# Patient Record
Sex: FEMALE | Race: WHITE | NOT HISPANIC OR LATINO | Employment: OTHER | URBAN - METROPOLITAN AREA
[De-identification: names, ages, dates, MRNs, and addresses within clinical notes are randomized per-mention and may not be internally consistent; named-entity substitution may affect disease eponyms.]

---

## 2017-02-08 ENCOUNTER — OFFICE VISIT (OUTPATIENT)
Dept: OPHTHALMOLOGY | Facility: CLINIC | Age: 57
End: 2017-02-08

## 2017-02-08 DIAGNOSIS — H43.393 VITREOUS SYNERESIS OF BOTH EYES: ICD-10-CM

## 2017-02-08 DIAGNOSIS — H35.419 LATTICE DEGENERATION OF PERIPHERAL RETINA, UNSPECIFIED LATERALITY: Primary | ICD-10-CM

## 2017-02-08 RX ORDER — GARLIC 180 MG
TABLET, DELAYED RELEASE (ENTERIC COATED) ORAL
COMMUNITY
End: 2019-11-14

## 2017-02-08 RX ORDER — SACCHAROMYCES BOULARDII 250 MG
250 CAPSULE ORAL 2 TIMES DAILY
COMMUNITY
End: 2017-03-16

## 2017-02-08 ASSESSMENT — CONF VISUAL FIELD
OS_NORMAL: 1
OD_NORMAL: 1

## 2017-02-08 ASSESSMENT — SLIT LAMP EXAM - LIDS
COMMENTS: NORMAL
COMMENTS: NORMAL

## 2017-02-08 ASSESSMENT — TONOMETRY
IOP_METHOD: ICARE
OD_IOP_MMHG: 20
OS_IOP_MMHG: 22

## 2017-02-08 ASSESSMENT — VISUAL ACUITY
OD_CC: 20/20
CORRECTION_TYPE: CONTACTS
METHOD: SNELLEN - LINEAR
OS_CC: 20/20

## 2017-02-08 ASSESSMENT — EXTERNAL EXAM - RIGHT EYE: OD_EXAM: NORMAL

## 2017-02-08 ASSESSMENT — CUP TO DISC RATIO
OD_RATIO: 0.2
OS_RATIO: 0.2

## 2017-02-08 ASSESSMENT — EXTERNAL EXAM - LEFT EYE: OS_EXAM: NORMAL

## 2017-02-08 NOTE — MR AVS SNAPSHOT
After Visit Summary   2/8/2017    Genesis Conklin    MRN: [id number]           Patient Information     Date Of Birth          1960        Visit Information        Provider Department      2/8/2017 8:40 AM Jaren Araujo MD [facility]         Follow-ups after your visit        Your next 10 appointments already scheduled     Apr 24, 2017  3:00 PM   Complete Exam with Jaren Araujo MD   [facility] ([facility])    [facility]  [address]  [facility] [address] [phone]              Who to contact     Please call your clinic at [phone] to:    Ask questions about your health    Make or cancel appointments    Discuss your medicines    Learn about your test results    Speak to your doctor   If you have compliments or concerns about an experience at your clinic, or if you wish to file a complaint, please contact [facility] Patient Relations at [phone] or email us at [email or web address]         Additional Information About Your Visit        Care EveryWhere ID     This is your Care EveryWhere ID. This could be used by other organizations to access your [place] medical records  [id number]         Blood Pressure from Last 3 Encounters:   No data found for BP    Weight from Last 3 Encounters:   No data found for Wt              Today, you had the following     No orders found for display       Primary Care Provider    None Specified       No primary provider on file.        Thank you!     Thank you for choosing [facility]  for your care. Our goal is always to provide you with excellent care. Hearing back from our patients is one way we can continue to improve our services. Please take a few minutes to complete the written survey that you may receive in the mail after your visit with us. Thank  you!             Your Updated Medication List - Protect others around you: Learn how to safely use, store and throw away your medicines at www.disposemymeds.org.          This list is accurate as of: 2/8/17  9:59 AM.  Always use your most recent med list.                   Brand Name Dispense Instructions for use    ASPIRIN PO          Black Cohosh 40 MG Caps          saccharomyces boulardii 250 MG capsule    FLORASTOR     Take 250 mg by mouth 2 times daily       VITAMIN D3 PO      Take by mouth daily

## 2017-02-08 NOTE — NURSING NOTE
Chief Complaints and History of Present Illnesses   Patient presents with     Eye Problem     spot in central [facility] RE     HPI    Symptoms:        Duration:  2 weeks         Comments:  Black or mesh pattern in [place]  Of RE notices more wearing glasses  And when reading  Yoli Benjamin COT 9:05 AM February 8, 2017

## 2017-02-24 ASSESSMENT — REFRACTION_WEARINGRX
OD_SPHERE: +1.50
SPECS_TYPE: PAL
SPECS_TYPE: SVL
OS_SPHERE: +1.50
OS_CYLINDER: +0.75
OD_CYLINDER: +0.75
OD_SPHERE: -7.75
OS_AXIS: 150
OD_ADD: +1.50
OD_AXIS: 95
OS_SPHERE: -9.00
OS_ADD: +1.50

## 2017-02-24 NOTE — PROGRESS NOTES
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 or sooner as needed.    I have confirmed the content of the chief complaint, history of present illness, review of systems, and past medical/surgical history sections and edited the information as needed.    Complete documentation of historical and exam elements from today's encounter can  be found in the full encounter summary report (not reduplicated in this progress  note). I personally obtained the chief complaint(s) and history of present illness. I  confirmed and edited as necessary the review of systems, past medical/surgical  history, family history, social history, and examination findings as documented by  others; and I examined the patient myself. I personally reviewed the relevant tests,  images, and reports as documented above. I formulated and edited as necessary the  assessment and plan and discussed the findings and management plan with the  patient and family.    LNOI Araujo M.D.

## 2017-03-16 ENCOUNTER — OFFICE VISIT (OUTPATIENT)
Dept: OPHTHALMOLOGY | Facility: CLINIC | Age: 57
End: 2017-03-16

## 2017-03-16 DIAGNOSIS — H52.4 PRESBYOPIA: ICD-10-CM

## 2017-03-16 DIAGNOSIS — H52.13 MYOPIA, BILATERAL: Primary | ICD-10-CM

## 2017-03-16 DIAGNOSIS — H52.10 MYOPIA, UNSPECIFIED LATERALITY: Primary | ICD-10-CM

## 2017-03-16 DIAGNOSIS — H43.393 VITREOUS SYNERESIS OF BOTH EYES: ICD-10-CM

## 2017-03-16 ASSESSMENT — REFRACTION_CURRENTRX
OS_BASECURVE: 8.6
OD_DIAMETER: 14.2
OS_BRAND: PROCLEAR
OD_SPHERE: -6.50
OD_BASECURVE: 8.6
OS_SPHERE: -8.50
OS_DIAMETER: 14.0
OS_BASECURVE: 8.6
OS_DIAMETER: 14.2
OD_SPHERE: -7.00
OD_BASECURVE: 8.6
OD_DIAMETER: 14.0
OS_SPHERE: -8.50
OD_BRAND: PROCLEAR

## 2017-03-16 ASSESSMENT — REFRACTION_WEARINGRX
OD_SPHERE: +1.75
SPECS_TYPE: OTC
OS_SPHERE: -9.00
OS_AXIS: 150
OD_ADD: +1.50
SPECS_TYPE: PAL
OS_SPHERE: +1.75
OS_ADD: +1.50
OD_SPHERE: -7.75
OS_CYLINDER: +0.75
OD_AXIS: 95
OD_CYLINDER: +0.75

## 2017-03-16 ASSESSMENT — REFRACTION_MANIFEST
OD_AXIS: 090
OD_SPHERE: -0.50
OS_SPHERE: -10.25
OD_CYLINDER: +0.50
OS_SPHERE: -0.25
OS_AXIS: 170
OD_SPHERE: -8.50
OS_CYLINDER: +0.75

## 2017-03-16 ASSESSMENT — EXTERNAL EXAM - RIGHT EYE: OD_EXAM: NORMAL

## 2017-03-16 ASSESSMENT — EXTERNAL EXAM - LEFT EYE: OS_EXAM: NORMAL

## 2017-03-16 ASSESSMENT — SLIT LAMP EXAM - LIDS
COMMENTS: NORMAL
COMMENTS: NORMAL

## 2017-03-16 ASSESSMENT — CONF VISUAL FIELD
OS_NORMAL: 1
OD_NORMAL: 1

## 2017-03-16 ASSESSMENT — TONOMETRY
IOP_METHOD: ICARE
OD_IOP_MMHG: 18
OS_IOP_MMHG: 19

## 2017-03-16 ASSESSMENT — VISUAL ACUITY
METHOD: SNELLEN - LINEAR
CORRECTION_TYPE: CONTACTS
OD_CC: 20/30
OS_CC: 20/20

## 2017-03-16 ASSESSMENT — CUP TO DISC RATIO
OS_RATIO: 0.2
OD_RATIO: 0.2

## 2017-03-16 NOTE — MR AVS SNAPSHOT
After Visit Summary   3/16/2017    Genesis Conklin    MRN: [id number]           Patient Information     Date Of Birth          1960        Visit Information        Provider Department      3/16/2017 11:30 AM MM [facility]        Today's Diagnoses     Myopia, bilateral    -  1    Presbyopia           Follow-ups after your visit        Your next 10 appointments already scheduled     Mar 23, 2017  9:00 AM CDT   CONTACT LENS FITTING with MM [facility] ([facility] Clinics)    [facility]  [address] [id number] [phone]              Who to contact     Please call your clinic at [phone] to:    Ask questions about your health    Make or cancel appointments    Discuss your medicines    Learn about your test results    Speak to your doctor   If you have compliments or concerns about an experience at your clinic, or if you wish to file a complaint, please contact [facility] Patient Relations at [phone] or email us at [email or web address]         Additional Information About Your Visit        Care EveryWhere ID     This is your Care EveryWhere ID. This could be used by other organizations to access your [place] medical records  [id number]         Blood Pressure from Last 3 Encounters:   No data found for BP    Weight from Last 3 Encounters:   No data found for Wt              Today, you had the following     No orders found for display         Today's Medication Changes          These changes are accurate as of: 3/16/17 11:59 PM.  If you have any questions, ask your nurse or doctor.               Stop taking these medicines if you haven't already. Please contact your care team if you have questions.     saccharomyces boulardii 250 MG capsule   Commonly known as:  FLORASTOR   Stopped by:  Jaren Araujo  MD Francia                    Primary Care Provider Office Phone # Fax #    Nery Sanford MD, [phone]       [facility] [address] [address]        Thank you!     Thank you for choosing [facility]  for your care. Our goal is always to provide you with excellent care. Hearing back from our patients is one way we can continue to improve our services. Please take a few minutes to complete the written survey that you may receive in the mail after your visit with us. Thank you!             Your Updated Medication List - Protect others around you: Learn how to safely use, store and throw away your medicines at www.disposemymeds.org.          This list is accurate as of: 3/16/17 11:59 PM.  Always use your most recent med list.                   Brand Name Dispense Instructions for use    ASPIRIN PO          Black Cohosh 40 MG Caps          PROBIOTIC DAILY PO          VITAMIN D3 PO      Take by mouth daily

## 2017-03-16 NOTE — NURSING NOTE
.  Chief Complaints and History of Present Illnesses   Patient presents with     Blurred Vision Both Eyes     contact lens blurry/dry     HPI    Affected eye(s):  Right   Symptoms:     Distorted vision         Do you have eye pain now?:  No      Comments:  Blurred [place] Lens irritation in RE, it is tight   Pulling on the Conj, only in RE  MOR today noted  Pt has to clean contacts often  Will give Clear Care and change  Diameter of Contact first to resolve  Tight issue, Pt C/O dry eyes uses [employer] Several  Times per day  Yoli Benjamin COT 10:38 AM March 16, 2017

## 2017-03-16 NOTE — PROGRESS NOTES
Assessment & Plan      Genesis Conklin is a 56 year old female with the following diagnoses:   (H52.10) Myopia, unspecified laterality  (primary encounter diagnosis)  Comment: Change  Plan: Rx    (H43.393) Vitreous syneresis of both eyes  Comment: More noticeable OD  Plan: Follow     -----------------------------------------------------------------------------------      Patient disposition:   Return in about 1 year (around 3/16/2018) for Complete Eye Exam. or sooner as needed.    Complete documentation of historical and exam elements from today's encounter can  be found in the full encounter summary report (not reduplicated in this progress  note). I personally obtained the chief complaint(s) and history of present illness. I  confirmed and edited as necessary the review of systems, past medical/surgical  history, family history, social history, and examination findings as documented by  others; and I examined the patient myself. I personally reviewed the relevant tests,  images, and reports as documented above. I formulated and edited as necessary the  assessment and plan and discussed the findings and management plan with the  patient and family.    LONI Araujo M.D

## 2017-03-16 NOTE — MR AVS SNAPSHOT
After Visit Summary   3/16/2017    Genesis Conklin    MRN: [id number]           Patient Information     Date Of Birth          1960        Visit Information        Provider Department      3/16/2017 10:00 AM Jaren Araujo MD [facility]        Today's Diagnoses     Myopia, unspecified laterality    -  1    Vitreous syneresis of both eyes           Follow-ups after your visit        Follow-up notes from your care team     Return in about 1 year (around 3/16/2018) for Complete Eye Exam.      Your next 10 appointments already scheduled     Mar 23, 2017  9:00 AM CDT   CONTACT LENS FITTING with MM [facility] MME TECH   [facility] ([facility])    [facility]  [address] [address] [phone]              Who to contact     Please call your clinic at [phone] to:    Ask questions about your health    Make or cancel appointments    Discuss your medicines    Learn about your test results    Speak to your doctor   If you have compliments or concerns about an experience at your clinic, or if you wish to file a complaint, please contact [facility] Patient Relations at [phone] or email us at [email or web address]         Additional Information About Your Visit        Care EveryWhere ID     This is your Care EveryWhere ID. This could be used by other organizations to access your [place] medical records  [id number]         Blood Pressure from Last 3 Encounters:   No data found for BP    Weight from Last 3 Encounters:   No data found for Wt              Today, you had the following     No orders found for display         Today's Medication Changes          These changes are accurate as of: 3/16/17 12:30 PM.  If you have any questions, ask your nurse or doctor.               Stop taking these medicines if you haven't already. Please  contact your care team if you have questions.     saccharomyces boulardii 250 MG capsule   Commonly known as:  FLORASTOR   Stopped by:  Jaren Araujo MD                    Primary Care Provider Office Phone # Fax #    Nery YAMILEX Sanford MD, [phone]       [facility] [address]  [facility] [address]        Thank you!     Thank you for choosing [facility]  for your care. Our goal is always to provide you with excellent care. Hearing back from our patients is one way we can continue to improve our services. Please take a few minutes to complete the written survey that you may receive in the mail after your visit with us. Thank you!             Your Updated Medication List - Protect others around you: Learn how to safely use, store and throw away your medicines at www.disposemymeds.org.          This list is accurate as of: 3/16/17 12:30 PM.  Always use your most recent med list.                   Brand Name Dispense Instructions for use    ASPIRIN PO          Black Cohosh 40 MG Caps          PROBIOTIC DAILY PO          VITAMIN D3 PO      Take by mouth daily

## 2017-03-20 ASSESSMENT — REFRACTION_CURRENTRX
OS_BASECURVE: 8.6
OD_DIAMETER: 14.2
OD_BRAND: PROCLEAR
OD_SPHERE: -6.50
OD_SPHERE: -7.00
OS_BASECURVE: 8.6
OS_SPHERE: -8.50
OD_DIAMETER: 14.0
OD_BASECURVE: 8.6
OD_BASECURVE: 8.6
OS_BRAND: PROCLEAR
OS_DIAMETER: 14.0
OS_ADDL_SPECS: BIOFINITY
OS_DIAMETER: 14.2
OS_SPHERE: -8.50
OD_ADDL_SPECS: BIOFINITY

## 2017-03-20 NOTE — PROGRESS NOTES
Contact Lens Billing  V-Code:  - Soft spherical    Contact Lens Fitting Level 2 CPT 6243371 $85.00  These are for cosmetic contact lenses.    Encounter Diagnoses   Name Primary?     Myopia, bilateral Yes     Presbyopia         Date of last eye exam: 3/16/17  Trials of [employer] 8.6/14.0 Follow up in week -7.00/-8.50

## 2017-03-23 ENCOUNTER — OFFICE VISIT (OUTPATIENT)
Dept: OPHTHALMOLOGY | Facility: CLINIC | Age: 57
End: 2017-03-23

## 2017-03-23 DIAGNOSIS — H52.13 MYOPIA, BILATERAL: Primary | ICD-10-CM

## 2017-03-23 ASSESSMENT — REFRACTION_CURRENTRX
OS_SPHERE: -8.50
OS_BRAND: PROCLEAR
OD_DIAMETER: 14.2
OS_DIAMETER: 14.2
OD_SPHERE: -6.50
OD_BRAND: PROCLEAR
OD_BASECURVE: 8.6
OS_BASECURVE: 8.6
OD_DIAMETER: 14.0
OD_SPHERE: -7.00
OS_DIAMETER: 14.0
OS_ADDL_SPECS: BIOFINITY
OD_BASECURVE: 8.6
OD_ADDL_SPECS: BIOFINITY
OS_BASECURVE: 8.6
OS_SPHERE: -8.50

## 2017-03-23 ASSESSMENT — VISUAL ACUITY
METHOD: SNELLEN - LINEAR
CORRECTION_TYPE: CONTACTS
OD_CC: 20/20
OS_CC: 20/20

## 2017-03-23 NOTE — MR AVS SNAPSHOT
After Visit Summary   3/23/2017    Genesis Conklin    MRN: [id number]           Patient Information     Date Of Birth          1960        Visit Information        Provider Department      3/23/2017 9:00 AM MM [facility] M[facility] TECH [facility]        Today's Diagnoses     Myopia, bilateral    -  1       Follow-ups after your visit        Who to contact     Please call your clinic at [phone] to:    Ask questions about your health    Make or cancel appointments    Discuss your medicines    Learn about your test results    Speak to your doctor   If you have compliments or concerns about an experience at your clinic, or if you wish to file a complaint, please contact [facility] Patient Relations at [phone] or email us at [email or web address]         Additional Information About Your Visit        Care EveryWhere ID     This is your Care EveryWhere ID. This could be used by other organizations to access your [place] medical records  [id number]         Blood Pressure from Last 3 Encounters:   No data found for BP    Weight from Last 3 Encounters:   No data found for Wt              Today, you had the following     No orders found for display       Primary Care Provider Office Phone # Fax #    Nery Sanford MD, [phone]       [facility] [address] [address]        Thank you!     Thank you for choosing [facility]  for your care. Our goal is always to provide you with excellent care. Hearing back from our patients is one way we can continue to improve our services. Please take a few minutes to complete the written survey that you may receive in the mail after your visit with us. Thank you!             Your Updated Medication List - Protect others around you: Learn how to safely use, store and throw away your medicines at www.disposemymeds.org.           This list is accurate as of: 3/23/17  9:21 AM.  Always use your most recent med list.                   Brand Name Dispense Instructions for use    ASPIRIN PO          Black Cohosh 40 MG Caps          PROBIOTIC DAILY PO          VITAMIN D3 PO      Take by mouth daily

## 2017-03-23 NOTE — PROGRESS NOTES
Chief Complaints and History of Present Illnesses   Patient presents with     Follow Up For     Conacts NO CHARGE for F/U     HPI    Symptoms:              Comments:  Pt loves her new contacts, she states they are   More comfortable she doesn't even know she is  Wearing them, return to clinic around 3/16/18  For complete with Dr Gayle Benjamin COT 9:20 AM March 23, 2017

## 2017-05-17 ENCOUNTER — TELEPHONE (OUTPATIENT)
Dept: OPHTHALMOLOGY | Facility: CLINIC | Age: 57
End: 2017-05-17

## 2017-05-19 ENCOUNTER — TELEPHONE (OUTPATIENT)
Dept: OPHTHALMOLOGY | Facility: CLINIC | Age: 57
End: 2017-05-19

## 2017-05-19 NOTE — TELEPHONE ENCOUNTER
Pt at [facility] medical eye clinic (under construction this week)  Pt had complaints of worsening symptoms after initial phone call 5-17-17  Pt called yesterday AM  Left messages yesterday, and today twice with direct triage number  No callback  Will forward to MME clinic tech to f/u Monday when clinic reopens  Asked pt to call me today yet if able  Asked to use on call system for worsening symptoms over weekend-- main hospital number provided  Brian Xiao RN 1:43 PM 05/19/17

## 2017-05-19 NOTE — TELEPHONE ENCOUNTER
----- Message from Brian iXao RN sent at 5/19/2017  8:28 AM CDT -----  Regarding: FW: Message for Yoli Araujo  Contact: [phone]   Left message with direct number at [phone]  Brian Xiao RN 8:28 AM 05/19/17    ----- Message -----     From: Brian Xiao RN     Sent: 5/18/2017   3:26 PM       To: Eye Triage-[facility]  Subject: FW: Message for Yoli Araujo                 Left message with direct triage number at [phone]  Brian Xiao RN 3:26 PM 05/18/17    ----- Message -----     From: Yoli Benjamin COT     Sent: 5/18/2017   2:42 PM       To: Brian Xiao RN  Subject: FW: Message for Yoli Araujo                Hello Sorry I forgot to send....  ----- Message -----     From: Carolann Hernandez     Sent: 5/18/2017   9:07 AM       To: Mme   Subject: Message for Yoli Araujo                    The pt wanted to fine tune the discussion she had with Yoli the other day. The Pt has an ongoing issue with her R eye. She has an aura that has lasted for 6 days which is almost constant. She has also had a change in vision in R eye so that now it is sort of cloudy.   Please call her on Monday 5.22.17 at [phone] to discuss.    Preethi Vuong    Please DO NOT send this message and/or reply back to sender.  Call Center Representatives DO NOT respond to messages.

## 2017-05-23 ENCOUNTER — OFFICE VISIT (OUTPATIENT)
Dept: OPHTHALMOLOGY | Facility: CLINIC | Age: 57
End: 2017-05-23

## 2017-05-23 DIAGNOSIS — H52.13 HIGH MYOPIA, BOTH EYES: ICD-10-CM

## 2017-05-23 DIAGNOSIS — H43.819 PVD (POSTERIOR VITREOUS DETACHMENT), UNSPECIFIED LATERALITY: Primary | ICD-10-CM

## 2017-05-23 DIAGNOSIS — H35.433: ICD-10-CM

## 2017-05-23 ASSESSMENT — CONF VISUAL FIELD
OD_NORMAL: 1
METHOD: COUNTING FINGERS
OS_NORMAL: 1

## 2017-05-23 ASSESSMENT — SLIT LAMP EXAM - LIDS
COMMENTS: NORMAL
COMMENTS: NORMAL

## 2017-05-23 ASSESSMENT — VISUAL ACUITY
OD_CC: 20/15-3
OS_CC: 20/15-3
CORRECTION_TYPE: CONTACTS
METHOD: SNELLEN - LINEAR

## 2017-05-23 ASSESSMENT — TONOMETRY
OD_IOP_MMHG: 20
OS_IOP_MMHG: 19
IOP_METHOD: ICARE

## 2017-05-23 ASSESSMENT — EXTERNAL EXAM - LEFT EYE: OS_EXAM: NORMAL

## 2017-05-23 ASSESSMENT — CUP TO DISC RATIO
OS_RATIO: 0.2
OD_RATIO: 0.2

## 2017-05-23 ASSESSMENT — EXTERNAL EXAM - RIGHT EYE: OD_EXAM: NORMAL

## 2017-05-23 NOTE — PROGRESS NOTES
HPI  Genesis Conklin is a 56 year old female here for new right sided visual aura three weeks ago-light was jagged over several days, more noticeable in bright light.  Now more floaters in the right eye.  No change in acuity.    PMH:  None   POH:  Glasses and soft contact lenses for mildly high myopia, no surgery, no trauma  Oc Meds:  none  FH:  Mother had glaucoma, denies age related macular degeneration, retinal detachment,  or other known eye diseases       Assessment & Plan      (H43.819) PVD (posterior vitreous detachment), unspecified laterality - right Eye  Comment:  No Dutch's sign, retinal tears, or detachment seen on exam today.  Plan:  Natural history of posterior vitreous detachment as well as retinal tear/detachment signs and symptoms discussed with patient.  Told to call for prompt dilated exam if these signs or symptoms occur.      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 Call for sooner appointment as needed.    Complete documentation of historical and exam elements from today's encounter can be found in the full encounter summary report (not reduplicated in this progress note). I personally obtained the chief complaint(s) and history of present illness.  I have confirmed and edited as necessary the CC, HPI, PMH/PSH, social history, FMH, ROS, and exam/neuro findings as obtained by the technician or others. I have examined this patient myself and I personally viewed the image(s) and studies listed above and the documentation reflects my findings and interpretation.

## 2017-05-23 NOTE — NURSING NOTE
Chief Complaints and History of Present Illnesses   Patient presents with     Floaters Right Eye     HPI    Affected eye(s):  Right   Symptoms:     Floaters   No flashes      Duration:  3 weeks   Frequency:  Constant       Do you have eye pain now?:  No      Comments:  Floaters, aura and filmy vision over RE x 3 weeks (has migraine headaches so it is an aura similar to what that was) - not seeing aura now but is seeing floaters constantly  No eye pain  No change in sharpness of vision  No curtain effect    Annamaria Balderas, COA 3:13 PM 05/23/2017

## 2017-05-23 NOTE — MR AVS SNAPSHOT
After Visit Summary   2017    Genesis Conklin    MRN: [id number]           Patient Information     Date Of Birth          1960        Visit Information        Provider Department      2017 2:20 PM Paula Lamas MD [facility]        Today's Diagnoses     PVD (posterior vitreous detachment), unspecified laterality - Right Eye    -  1    High myopia, both eyes - Both Eyes        Paving stone degeneration of peripheral retina, bilateral - Both Eyes           Follow-ups after your visit        Follow-up notes from your care team     Return in about 1 month (around 2017) for Follow-up PVD.      Who to contact     Please call your clinic at [phone] to:    Ask questions about your health    Make or cancel appointments    Discuss your medicines    Learn about your test results    Speak to your doctor   If you have compliments or concerns about an experience at your clinic, or if you wish to file a complaint, please contact [facility] Patient Relations at [phone] or email us at [email or web address]         Additional Information About Your Visit        MyChart Information     blinkbox music is an electronic gateway that provides easy, online access to your medical records. With blinkbox music, you can request a clinic appointment, read your test results, renew a prescription or communicate with your care team.     To sign up for blinkbox music visit the website at [email or web address]   You will be asked to enter the access code listed below, as well as some personal information. Please follow the directions to create your username and password.     Your access code is: [email or web address]  Expires: 2017  4:19 PM     Your access code will  in 90 days. If you need help or a new code, please contact your [facility] or call [phone] for assistance.        Care EveryWhere ID      This is your Care EveryWhere ID. This could be used by other organizations to access your [place] medical records  [id number]         Blood Pressure from Last 3 Encounters:   No data found for BP    Weight from Last 3 Encounters:   No data found for Wt              Today, you had the following     No orders found for display       Primary Care Provider Office Phone # Fax #    Nery Sanford MD, [phone]       [facility] [address]  [facility] [address]        Thank you!     Thank you for choosing [facility]  for your care. Our goal is always to provide you with excellent care. Hearing back from our patients is one way we can continue to improve our services. Please take a few minutes to complete the written survey that you may receive in the mail after your visit with us. Thank you!             Your Updated Medication List - Protect others around you: Learn how to safely use, store and throw away your medicines at www.disposemymeds.org.          This list is accurate as of: 5/23/17 11:59 PM.  Always use your most recent med list.                   Brand Name Dispense Instructions for use    ASPIRIN PO          Black Cohosh 40 MG Caps          PROBIOTIC DAILY PO          VITAMIN D3 PO      Take by mouth daily

## 2017-06-27 ENCOUNTER — OFFICE VISIT (OUTPATIENT)
Dept: OPHTHALMOLOGY | Facility: CLINIC | Age: 57
End: 2017-06-27

## 2017-06-27 DIAGNOSIS — H43.819 PVD (POSTERIOR VITREOUS DETACHMENT), UNSPECIFIED LATERALITY: Primary | ICD-10-CM

## 2017-06-27 DIAGNOSIS — H52.13 HIGH MYOPIA, BOTH EYES: ICD-10-CM

## 2017-06-27 ASSESSMENT — VISUAL ACUITY
OD_CC: 20/15
OD_CC+: -1
METHOD: SNELLEN - LINEAR
OS_CC: 20/20
CORRECTION_TYPE: CONTACTS
OS_CC+: +2

## 2017-06-27 ASSESSMENT — SLIT LAMP EXAM - LIDS
COMMENTS: NORMAL
COMMENTS: NORMAL

## 2017-06-27 ASSESSMENT — CONF VISUAL FIELD
OS_NORMAL: 1
OD_NORMAL: 1
METHOD: COUNTING FINGERS

## 2017-06-27 ASSESSMENT — CUP TO DISC RATIO
OD_RATIO: 0.2
OS_RATIO: 0.2

## 2017-06-27 ASSESSMENT — EXTERNAL EXAM - LEFT EYE: OS_EXAM: NORMAL

## 2017-06-27 ASSESSMENT — EXTERNAL EXAM - RIGHT EYE: OD_EXAM: NORMAL

## 2017-06-27 ASSESSMENT — TONOMETRY
OS_IOP_MMHG: 16
OD_IOP_MMHG: 17
IOP_METHOD: ICARE

## 2017-06-27 NOTE — MR AVS SNAPSHOT
After Visit Summary   2017    Genesis Conklin    MRN: [id number]           Patient Information     Date Of Birth          1960        Visit Information        Provider Department      2017 8:20 AM Paula Lamas MD [facility]        Today's Diagnoses     PVD (posterior vitreous detachment), unspecified laterality - Right Eye    -  1    High myopia, both eyes - Both Eyes           Follow-ups after your visit        Follow-up notes from your care team     Return in about 6 months (around 2017) for Comprehensive Exam- myopia, ctl wearer- wpr patient.      Who to contact     Please call your clinic at [phone] to:    Ask questions about your health    Make or cancel appointments    Discuss your medicines    Learn about your test results    Speak to your doctor   If you have compliments or concerns about an experience at your clinic, or if you wish to file a complaint, please contact [facility] Patient Relations at [phone] or email us at [email or web address]         Additional Information About Your Visit        MyChart Information     Forseva is an electronic gateway that provides easy, online access to your medical records. With Forseva, you can request a clinic appointment, read your test results, renew a prescription or communicate with your care team.     To sign up for Forseva visit the website at [email or web address]   You will be asked to enter the access code listed below, as well as some personal information. Please follow the directions to create your username and password.     Your access code is: [email or web address]  Expires: 2017  4:19 PM     Your access code will  in 90 days. If you need help or a new code, please contact your [facility] or call [phone] for assistance.        Care EveryWhere ID     This is your Care EveryWhere ID.  This could be used by other organizations to access your [place] medical records  [id number]         Blood Pressure from Last 3 Encounters:   No data found for BP    Weight from Last 3 Encounters:   No data found for Wt              Today, you had the following     No orders found for display       Primary Care Provider Office Phone # Fax #    Nery Sanford MD, [phone]       [facility] [address]  [place] [address]        Equal Access to Services     [facility] : Hadii aad ku hadasho Soomaali, waaxda luqadaha, qaybta kaalmada adeegyada, waxay idiin hayaan adeeg kharash la'aan . So [facility] [phone].    ATENCIÓN: Si habla español, tiene a avila disposición servicios gratuitos de asistencia lingüística. [facility] [phone].    We comply with applicable federal civil rights laws and [place] laws. We do not discriminate on the basis of race, color, national origin, age, disability sex, sexual orientation or gender identity.            Thank you!     Thank you for choosing [facility]  for your care. Our goal is always to provide you with excellent care. Hearing back from our patients is one way we can continue to improve our services. Please take a few minutes to complete the written survey that you may receive in the mail after your visit with us. Thank you!             Your Updated Medication List - Protect others around you: Learn how to safely use, store and throw away your medicines at www.disposemymeds.org.          This list is accurate as of: 6/27/17  4:23 PM.  Always use your most recent med list.                   Brand Name Dispense Instructions for use Diagnosis    ASPIRIN PO           Black Cohosh 40 MG Caps           PROBIOTIC DAILY PO           VITAMIN D3 PO      Take by mouth daily

## 2017-06-27 NOTE — PROGRESS NOTES
HPI  Genesis Conklin is a 56 year old female here for follow-up of posterior vitreous detachment right eye.  No more floaters in the right eye, still sees the same larger one.  No flashes, seems blurry in the right eye still.    PMH:  None   POH:  Glasses and soft contact lenses for mildly high myopia, no surgery, no trauma  Oc Meds:  none  FH:  Mother had glaucoma, denies age related macular degeneration, retinal detachment,  or other known eye diseases       Assessment & Plan      (H43.819) PVD (posterior vitreous detachment), unspecified laterality - right Eye  Comment:  No Dutch's sign, retinal tears, or detachment seen again on exam today   Plan:  Natural history of posterior vitreous detachment as well as retinal tear/detachment signs and symptoms discussed with patient.  Told to call for prompt dilated exam if these signs or symptoms occur.      (H52.13) High myopia, both eyes - Both Eyes  Comment: stable visual acuity   Plan: reviewed higher risk of retinal tear/rd per above         -----------------------------------------------------------------------------------    Patient disposition:   Return in about 6 months (around 12/27/2017) for Comprehensive Exam- myopia, ctl wearer- wpr patient. Call for sooner appointment as needed.    Complete documentation of historical and exam elements from today's encounter can be found in the full encounter summary report (not reduplicated in this progress note). I personally obtained the chief complaint(s) and history of present illness.  I have confirmed and edited as necessary the CC, HPI, PMH/PSH, social history, FMH, ROS, and exam/neuro findings as obtained by the technician or others. I have examined this patient myself and I personally viewed the image(s) and studies listed above and the documentation reflects my findings and interpretation.

## 2017-06-27 NOTE — NURSING NOTE
Chief Complaints and History of Present Illnesses   Patient presents with     Follow Up For     5 week recheck of PVD of RE     HPI    Affected eye(s):  Right   Symptoms:     No decreased vision   Floaters (Comment: no increase in amount of floaters noted since last visit)   No flashes      Duration:  5 weeks   Frequency:  Constant       Do you have eye pain now?:  No      Chief Complaints and History of Present Illnesses   Patient presents with     Follow Up For     5 week recheck of PVD of RE     HPI    Affected eye(s):  Right   Symptoms:     Blurred vision   No decreased vision   Floaters (Comment: no increase in amount of floaters noted since last visit)   No flashes      Duration:  5 weeks   Frequency:  Constant       Do you have eye pain now?:  No      Comments:  Pt feels that her brain is getting use to the floaters / no more noticed / no shadow effect, but does note 'clouding of VA of RE'. Has cleaned CTL multiple times so doesn't think CTL is a contributory factor.  Reina GALLEGOS 8:45 AM 06/27/2017

## 2018-02-06 ENCOUNTER — OFFICE VISIT (OUTPATIENT)
Dept: OPHTHALMOLOGY | Facility: CLINIC | Age: 58
End: 2018-02-06
Payer: COMMERCIAL

## 2018-02-06 DIAGNOSIS — H52.4 PRESBYOPIA OF BOTH EYES: ICD-10-CM

## 2018-02-06 DIAGNOSIS — H43.819 PVD (POSTERIOR VITREOUS DETACHMENT), UNSPECIFIED LATERALITY: Primary | ICD-10-CM

## 2018-02-06 ASSESSMENT — CUP TO DISC RATIO
OS_RATIO: 0.2
OD_RATIO: 0.2

## 2018-02-06 ASSESSMENT — TONOMETRY
OS_IOP_MMHG: 14
IOP_METHOD: TONOPEN
OD_IOP_MMHG: 14

## 2018-02-06 ASSESSMENT — EXTERNAL EXAM - RIGHT EYE: OD_EXAM: NORMAL

## 2018-02-06 ASSESSMENT — SLIT LAMP EXAM - LIDS
COMMENTS: NORMAL
COMMENTS: NORMAL

## 2018-02-06 ASSESSMENT — CONF VISUAL FIELD
METHOD: COUNTING FINGERS
OS_NORMAL: 1
OD_NORMAL: 1

## 2018-02-06 ASSESSMENT — VISUAL ACUITY
OS_CC: 20/20
OD_CC+: -2
OD_CC: 20/20
METHOD: SNELLEN - LINEAR
CORRECTION_TYPE: CONTACTS
OS_CC+: +

## 2018-02-06 ASSESSMENT — EXTERNAL EXAM - LEFT EYE: OS_EXAM: NORMAL

## 2018-02-06 NOTE — MR AVS SNAPSHOT
After Visit Summary   2018    Genesis Conklin    MRN: [id number]           Patient Information     Date Of Birth          1960        Visit Information        Provider Department      2018 11:40 AM Paula Lamas MD [facility]        Today's Diagnoses     PVD (posterior vitreous detachment), unspecified laterality - Right Eye    -  1    Presbyopia of both eyes - Both Eyes           Follow-ups after your visit        Follow-up notes from your care team     Return in about 1 year (around 2019).      Who to contact     Please call your clinic at [phone] to:    Ask questions about your health    Make or cancel appointments    Discuss your medicines    Learn about your test results    Speak to your doctor   If you have compliments or concerns about an experience at your clinic, or if you wish to file a complaint, please contact [facility] Patient Relations at [phone] or email us at [email or web address]         Additional Information About Your Visit        MyChart Information     mobintent is an electronic gateway that provides easy, online access to your medical records. With mobintent, you can request a clinic appointment, read your test results, renew a prescription or communicate with your care team.     To sign up for mobintent visit the website at [email or web address]   You will be asked to enter the access code listed below, as well as some personal information. Please follow the directions to create your username and password.     Your access code is: [email or web address]  Expires: 2018  6:30 AM     Your access code will  in 90 days. If you need help or a new code, please contact your [facility] or call [phone] for assistance.        Care EveryWhere ID     This is your Care EveryWhere ID. This could be used by other organizations to access your  [place] medical records  [id number]         Blood Pressure from Last 3 Encounters:   No data found for BP    Weight from Last 3 Encounters:   No data found for Wt              Today, you had the following     No orders found for display       Primary Care Provider Office Phone # Fax #    Nery Sanford MD, [phone]       [facility] [address]  [place] [address]        Equal Access to Services     [facility]: Hadii aad ku hadasho Soomaali, waaxda luqadaha, qaybta kaalmada adeegyada, waxay idiin hayaan adeeg kharash la'aan . So [facility] [phone].    ATENCIÓN: Si habla español, tiene a avila disposición servicios gratuitos de asistencia lingüística. Llame al [phone].    We comply with applicable federal civil rights laws and [place] laws. We do not discriminate on the basis of race, color, national origin, age, disability, sex, sexual orientation, or gender identity.            Thank you!     Thank you for choosing [facility]  for your care. Our goal is always to provide you with excellent care. Hearing back from our patients is one way we can continue to improve our services. Please take a few minutes to complete the written survey that you may receive in the mail after your visit with us. Thank you!             Your Updated Medication List - Protect others around you: Learn how to safely use, store and throw away your medicines at www.disposemymeds.org.          This list is accurate as of 2/6/18  5:01 PM.  Always use your most recent med list.                   Brand Name Dispense Instructions for use Diagnosis    ASPIRIN PO           Black Cohosh 40 MG Caps           PROBIOTIC DAILY PO           VITAMIN D3 PO      Take by mouth daily

## 2018-02-06 NOTE — NURSING NOTE
No chief complaint on file.    HPI    Affected eye(s):  Right   Symptoms:     Blurred vision   Floaters (Comment: RE still present no changes in presentation / 'they are black')      Duration:  6 months   Frequency:  Constant       Do you have eye pain now?:  No      Comments:   Chief Complaints and History of Present Illnesses   Patient presents with     Vitreous Floaters Follow Up     floater recheck of RE     HPI    Affected eye(s):  Right   Symptoms:     Blurred vision   Floaters (Comment: RE still present no changes in presentation / 'they are black')      Duration:  6 months   Frequency:  Constant       Do you have eye pain now?:  No      Comments:  Still feels like looking through a film with RE / States was present at [place] exam.  Pt wonders if VA changing as has had to go from a 1.75 to a 2.00 otc reader.  Reina GALLEGOS 11:46 AM 02/06/2018

## 2018-02-06 NOTE — PROGRESS NOTES
HPI  Genesis Conklin is a 57 year old female here for follow-up of floaters/posterior vitreous detachment right eye.  No new floaters in the right eye, still sees the same larger one and a smokey view sometimes worse than others.  No flashes.  PMH:  migrane  POH:  Glasses and soft contact lenses for mildly high myopia, no surgery, no trauma  Oc Meds:  none  FH:  Mother had glaucoma, denies age related macular degeneration, retinal detachment,  or other known eye diseases       Assessment & Plan      (H43.819) PVD (posterior vitreous detachment), unspecified laterality - right Eye  Comment:  No Dutch's sign, retinal tears, or detachment seen again on exam today. Does have one opaque floater centrally plus syneresis she is likely seeing under certain conditions   Plan:  Natural history of posterior vitreous detachment as well as retinal tear/detachment signs and symptoms discussed with patient.  Told to call for prompt dilated exam if these signs or symptoms occur.    (367.4) Presbyopia  Comment: slightly worse   Plan: update over the counter readers over contact lenses, reassured patient     -----------------------------------------------------------------------------------    Patient disposition:   Return in about 1 year (around 2/6/2019). Call for sooner appointment as needed.    Complete documentation of historical and exam elements from today's encounter can be found in the full encounter summary report (not reduplicated in this progress note). I personally obtained the chief complaint(s) and history of present illness.  I have confirmed and edited as necessary the CC, HPI, PMH/PSH, social history, FMH, ROS, and exam/neuro findings as obtained by the technician or others. I have examined this patient myself and I personally viewed the image(s) and studies listed above and the documentation reflects my findings and interpretation.

## 2019-03-11 ENCOUNTER — TELEPHONE (OUTPATIENT)
Dept: OPHTHALMOLOGY | Facility: CLINIC | Age: 59
End: 2019-03-11

## 2019-03-20 ENCOUNTER — DOCUMENTATION ONLY (OUTPATIENT)
Dept: CARE COORDINATION | Facility: CLINIC | Age: 59
End: 2019-03-20

## 2019-03-20 ENCOUNTER — OFFICE VISIT (OUTPATIENT)
Dept: OPHTHALMOLOGY | Facility: CLINIC | Age: 59
End: 2019-03-20
Payer: COMMERCIAL

## 2019-03-20 DIAGNOSIS — H52.13 MYOPIA OF BOTH EYES: Primary | ICD-10-CM

## 2019-03-20 DIAGNOSIS — H43.819 PVD (POSTERIOR VITREOUS DETACHMENT), UNSPECIFIED LATERALITY: ICD-10-CM

## 2019-03-20 DIAGNOSIS — H44.22 MYOPIC DEGENERATION, LEFT: ICD-10-CM

## 2019-03-20 DIAGNOSIS — H35.411 LATTICE DEGENERATION OF RIGHT RETINA: ICD-10-CM

## 2019-03-20 ASSESSMENT — REFRACTION_CURRENTRX
OS_BASECURVE: 8.6
OD_BASECURVE: 8.6
OS_BRAND: BIOFINITY
OD_SPHERE: -7.00
OD_BRAND: BIOFINITY
OD_DIAMETER: 14.0
OS_SPHERE: -8.50
OS_DIAMETER: 14.0

## 2019-03-20 ASSESSMENT — VISUAL ACUITY
OD_CC: J1+
OD_CC: 20/20
CORRECTION_TYPE: CONTACTS
OS_CC+: -1
OS_CC: J1+
METHOD: SNELLEN - LINEAR
OS_CC: 20/20

## 2019-03-20 ASSESSMENT — CONF VISUAL FIELD
METHOD: COUNTING FINGERS
OS_NORMAL: 1
OD_NORMAL: 1

## 2019-03-20 ASSESSMENT — REFRACTION_MANIFEST
OS_CYLINDER: +0.50
OS_ADD: +2.25
OS_AXIS: 002
OD_CYLINDER: SPHERE
OD_SPHERE: -7.75
OD_ADD: +2.25
OS_SPHERE: -10.25

## 2019-03-20 ASSESSMENT — SLIT LAMP EXAM - LIDS
COMMENTS: NORMAL
COMMENTS: NORMAL

## 2019-03-20 ASSESSMENT — EXTERNAL EXAM - RIGHT EYE: OD_EXAM: NORMAL

## 2019-03-20 ASSESSMENT — REFRACTION_WEARINGRX
OD_SPHERE: +2.00
OS_SPHERE: +2.00
SPECS_TYPE: READERS
OS_CYLINDER: SPHERE
OD_CYLINDER: SPHERE

## 2019-03-20 ASSESSMENT — TONOMETRY
OS_IOP_MMHG: 16
IOP_METHOD: ICARE
OD_IOP_MMHG: 17

## 2019-03-20 ASSESSMENT — CUP TO DISC RATIO
OD_RATIO: 0.2
OS_RATIO: 0.2

## 2019-03-20 ASSESSMENT — EXTERNAL EXAM - LEFT EYE: OS_EXAM: NORMAL

## 2019-03-20 NOTE — PROGRESS NOTES
History  HPI     Annual Eye Exam     In both eyes.  Since onset it is stable.  Treatments tried include no treatments.  Pain was noted as 0/10.              Contact Lens Evaluation     In both eyes.  Pain was noted as 0/10.              Comments     Pt states has long standing floaters in the right eye, is more bothered by it at night while reading. No increase in floaters, no flashes. Pt happy with contacts, they are comfortable and would like to update Rx today.    Soni GALLEGOS 3:26 PM March 20, 2019             Last edited by Soni Malcolm COT on 3/20/2019  3:26 PM. (History)          Assessment/Plan  (H52.13) Myopia of both eyes  (primary encounter diagnosis)  Comment: High myopia both eyes, no change in contact lens prescription  Plan: HC CONTACT LENS FITTING COSMETIC LVL 1 (13301.011), REFRACTION [21885]         Educated patient on condition and clinical findings. Dispensed spectacle prescription for full time wear. Educated patient on possibility of adaptation period, if symptoms do not improve return to clinic for further testing.   Dispensed finalized contact lens prescription for 2 years.    (H44.22) Myopic degeneration, left  Comment: Macular schisis left eye, not involving fovea; hypopigmentation adjacent to fovea  Plan: OCT Retina Spectralis OU (both eyes)         Referred to Dr. Carranza for retina consultation.    (H43.819) PVD (posterior vitreous detachment), unspecified laterality - Right Eye  Comment: Longstanding  Plan:  Discussed treatment vs. Monitoring. Patient not interested in treatment at this time.    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 If symptoms present, return to clinic immediately.    Contact Lens Billing  V-Code:  - Soft spherical  Final Contact Lens Rx       Brand Base Curve Diameter Sphere Lens    Right  Biofinity 8.6 14.0 -7.00 Daily Wear    Left Biofinity 8.6 14.0 -8.50 Daily Wear    Expiration Date:  3/20/2021    Replacement:  Monthly    Wearing Schedule:  Daily wear         CL Fitting Fee: $75    These are for cosmetic contact lenses.    Encounter Diagnoses   Name Primary?     Myopia of both eyes Yes     Myopic degeneration, left      PVD (posterior vitreous detachment), unspecified laterality - Right Eye      Lattice degeneration of right retina       Complete documentation of historical and exam elements from today's encounter can  be found in the full encounter summary report (not reduplicated in this progress  note). I personally obtained the chief complaint(s) and history of present illness. I  confirmed and edited as necessary the review of systems, past medical/surgical  history, family history, social history, and examination findings as documented by  others; and I examined the patient myself. I personally reviewed the relevant tests,  images, and reports as documented above. I formulated and edited as necessary the  assessment and plan and discussed the findings and management plan with the  patient and family.    Servando Rodriguez OD, FAAO`

## 2019-03-20 NOTE — NURSING NOTE
Chief Complaints and History of Present Illnesses   Patient presents with     Annual Eye Exam     Contact Lens Evaluation     Chief Complaint(s) and History of Present Illness(es)     Annual Eye Exam     Laterality: both eyes    Course: stable    Treatments tried: no treatments    Pain scale: 0/10              Contact Lens Evaluation     Laterality: both eyes    Pain scale: 0/10              Comments     Pt states has long standing floaters in the right eye, is more bothered by it at night while reading. No increase in floaters, no flashes. Pt happy with contacts, they are comfortable and would like to update Rx today.    Soni Malcolm COT 3:26 PM March 20, 2019

## 2019-03-21 ENCOUNTER — OFFICE VISIT (OUTPATIENT)
Dept: OPHTHALMOLOGY | Facility: CLINIC | Age: 59
End: 2019-03-21
Attending: OPHTHALMOLOGY
Payer: COMMERCIAL

## 2019-03-21 DIAGNOSIS — H52.13 MYOPIA OF BOTH EYES: ICD-10-CM

## 2019-03-21 DIAGNOSIS — H43.819 PVD (POSTERIOR VITREOUS DETACHMENT), UNSPECIFIED LATERALITY: ICD-10-CM

## 2019-03-21 DIAGNOSIS — Q89.7: Primary | ICD-10-CM

## 2019-03-21 DIAGNOSIS — H44.22 MYOPIC DEGENERATION, LEFT: ICD-10-CM

## 2019-03-21 PROCEDURE — G0463 HOSPITAL OUTPT CLINIC VISIT: HCPCS | Mod: ZF

## 2019-03-21 PROCEDURE — 92250 FUNDUS PHOTOGRAPHY W/I&R: CPT | Mod: ZF | Performed by: OPHTHALMOLOGY

## 2019-03-21 ASSESSMENT — CONF VISUAL FIELD
OS_NORMAL: 1
METHOD: COUNTING FINGERS
OD_NORMAL: 1

## 2019-03-21 ASSESSMENT — VISUAL ACUITY
OD_CC: 20/15
OS_CC: 20/15
OS_CC+: -2
METHOD: SNELLEN - LINEAR
OD_CC+: -1
CORRECTION_TYPE: CONTACTS

## 2019-03-21 ASSESSMENT — REFRACTION_WEARINGRX
OD_CYLINDER: SPHERE
SPECS_TYPE: READERS
OS_SPHERE: +2.00
OS_CYLINDER: SPHERE
OD_SPHERE: +2.00

## 2019-03-21 ASSESSMENT — TONOMETRY
IOP_METHOD: ICARE
OD_IOP_MMHG: 14
OS_IOP_MMHG: 17

## 2019-03-21 ASSESSMENT — EXTERNAL EXAM - LEFT EYE: OS_EXAM: NORMAL

## 2019-03-21 ASSESSMENT — EXTERNAL EXAM - RIGHT EYE: OD_EXAM: NORMAL

## 2019-03-21 ASSESSMENT — CUP TO DISC RATIO
OS_RATIO: 0.2
OD_RATIO: 0.2

## 2019-03-21 ASSESSMENT — SLIT LAMP EXAM - LIDS
COMMENTS: NORMAL
COMMENTS: NORMAL

## 2019-03-21 NOTE — PROGRESS NOTES
I have confirmed the patient's and reviewed Past Medical History, Past Surgical History, Social History, Family History, Problem List, Medication List and agree with Tech note.    CC: Macular schisis- referral from Dr. Rodriguez    HPI: Presents as referral from Dr. Rodriguez for macular schisis left eye in setting of high myopia. No changes in vision, flashes or floaters noted. History of spontaenous closure of macula hole left eye several years ago.    OCT Macula:  RE: normal, outer retinal thinning likely related to myopia  LE: PHF detached, outer retinal strippling, inferior parafoveal macula schisis     Assessment/plan:   1.  Macular schisis left eye- inferior macula   -Optos and fundus autofluorescence 3/21/19   -VA 20/15   -observe    2. PVD left   -observe    3. High myopia left> right, anisometropia, no sign of amblyopia with lattice degeneration   -S/S of retinal tear/ detachment discussed    4. History of macular hole- resolved spontaneously by history left    - observe    RTC 6 months, oct macula, dfe    Shayna Caba MD  PGY-3 Ophthalmology    ATTESTATION:  I have seen and examined the patient with   and agree with the findings in this note, as well as the interpretations of the diagnostic tests.    Damien Reyes MD PhD.  Professor & Chair

## 2019-03-21 NOTE — NURSING NOTE
Chief Complaints and History of Present Illnesses   Patient presents with     Retinal Evaluation      Myopic degeneration, left     Chief Complaint(s) and History of Present Illness(es)     Retinal Evaluation     Comments:  Myopic degeneration, left              Comments     Pt states vision is the same as last visit. Floaters in RE, no changes. No eye pain.    Kelli WILLIAM March 21, 2019 9:18 AM

## 2019-11-14 ENCOUNTER — OFFICE VISIT (OUTPATIENT)
Dept: OPHTHALMOLOGY | Facility: CLINIC | Age: 59
End: 2019-11-14
Attending: OPHTHALMOLOGY
Payer: COMMERCIAL

## 2019-11-14 DIAGNOSIS — Q89.7: ICD-10-CM

## 2019-11-14 PROCEDURE — 92134 CPTRZ OPH DX IMG PST SGM RTA: CPT | Mod: ZF | Performed by: OPHTHALMOLOGY

## 2019-11-14 PROCEDURE — G0463 HOSPITAL OUTPT CLINIC VISIT: HCPCS | Mod: ZF

## 2019-11-14 ASSESSMENT — TONOMETRY
OS_IOP_MMHG: 19
IOP_METHOD: TONOPEN
OD_IOP_MMHG: 17

## 2019-11-14 ASSESSMENT — VISUAL ACUITY
METHOD: SNELLEN - LINEAR
OD_CC: 20/20
CORRECTION_TYPE: CONTACTS
OS_CC: 20/20
OD_CC+: +2

## 2019-11-14 ASSESSMENT — CONF VISUAL FIELD
OS_NORMAL: 1
OD_NORMAL: 1

## 2019-11-14 NOTE — NURSING NOTE
Chief Complaints and History of Present Illnesses   Patient presents with     Follow Up     Chief Complaint(s) and History of Present Illness(es)     Follow Up     Laterality: both eyes    Onset: 8 months ago    Associated symptoms: floaters (noted predominantly in the right eye - doesn't feel changed).  Negative for flashes and eye pain    Pain scale: 0/10              Comments     8 month f/u for schisis, PVD, and high myopia  Pt feels vision is stable    MIKE Moreland 8:57 AM November 14, 2019

## 2019-11-14 NOTE — PROGRESS NOTES
I have confirmed the patient's and reviewed Past Medical History, Past Surgical History, Social History, Family History, Problem List, Medication List and agree with Tech note.    CC: Macular schisis- referral from Dr. Rodriguez    HPI: Presents as referral from Dr. Rodriguez for macular schisis left eye in setting of high myopia. No changes in vision, flashes or floaters noted. History of spontaenous closure of macula hole left eye several years ago.    OCT Macula:  RE: normal, outer retinal thinning likely related to myopia  LE: PHF detached, outer retinal strippling, inferior parafoveal macula schisis     Assessment/plan:   1.  Macular schisis left eye- inferior macula   -Optos and fundus autofluorescence 3/21/19 and OCT today    -VA 20/15   -observe    2. PVD left   -observe    3. High myopia left> right, anisometropia, no sign of amblyopia with lattice degeneration   -S/S of retinal tear/ detachment discussed    4. History of macular hole- resolved spontaneously by history left    - observe    RTC 12 months,       Damien Reyes MD PhD.  Professor & Chair

## 2020-03-25 ENCOUNTER — TELEPHONE (OUTPATIENT)
Dept: OPHTHALMOLOGY | Facility: CLINIC | Age: 60
End: 2020-03-25

## 2020-03-25 NOTE — TELEPHONE ENCOUNTER
[facility] Call Center    Phone Message    May a detailed message be left on voicemail: yes     Reason for Call: Other: Pt calling to get prescription of Glasses and contacts emailed to her  [email or web address] ([email or web address])  Thank you,    Action Taken: Message routed to:  [facility] ([facility]): eye    Travel Screening: Not Applicable     Eyeglass and contact lens RX emailed to patient as requested

## 2021-01-15 ENCOUNTER — HEALTH MAINTENANCE LETTER (OUTPATIENT)
Age: 61
End: 2021-01-15

## 2021-05-04 ENCOUNTER — TELEPHONE (OUTPATIENT)
Dept: OPHTHALMOLOGY | Facility: CLINIC | Age: 61
End: 2021-05-04

## 2021-05-04 NOTE — TELEPHONE ENCOUNTER
[facility]    Phone Message    May a detailed message be left on voicemail: yes     Reason for Call: Symptoms or Concerns     If patient has red-flag symptoms, warm transfer to triage line    Current symptom or concern: The pt states she has floaters that changed her vision dramatically. Please call the pt to discuss what she should do. Thanks.    Symptoms have been present for:  ? day(s)    Has patient previously been seen for this? No    By : Rolando Weston    Date: ?    Are there any new or worsening symptoms? Yes: see above      Action Taken: Message routed to:  [facility] ([facility]): sary eye gen    Travel Screening: Not Applicable

## 2021-05-04 NOTE — TELEPHONE ENCOUNTER
I spoke to the patient who has some concerts on a new left eye floater that is very visible today.  I scheduled her for tomorrow.

## 2021-05-05 ENCOUNTER — OFFICE VISIT (OUTPATIENT)
Dept: OPHTHALMOLOGY | Facility: CLINIC | Age: 61
End: 2021-05-05
Attending: OPHTHALMOLOGY
Payer: COMMERCIAL

## 2021-05-05 DIAGNOSIS — Q89.7: Primary | ICD-10-CM

## 2021-05-05 DIAGNOSIS — Q89.7: ICD-10-CM

## 2021-05-05 PROCEDURE — G0463 HOSPITAL OUTPT CLINIC VISIT: HCPCS

## 2021-05-05 PROCEDURE — 99213 OFFICE O/P EST LOW 20 MIN: CPT | Mod: GC | Performed by: OPHTHALMOLOGY

## 2021-05-05 PROCEDURE — 92134 CPTRZ OPH DX IMG PST SGM RTA: CPT | Performed by: OPHTHALMOLOGY

## 2021-05-05 RX ORDER — CALCIUM CARBONATE/VITAMIN D3 500-10/5ML
LIQUID (ML) ORAL
COMMUNITY
Start: 2021-02-01

## 2021-05-05 ASSESSMENT — EXTERNAL EXAM - RIGHT EYE: OD_EXAM: NORMAL

## 2021-05-05 ASSESSMENT — VISUAL ACUITY
OS_CC: 20/20
CORRECTION_TYPE: CONTACTS
METHOD: SNELLEN - LINEAR
OD_CC: 20/15
OS_CC+: -1
OD_CC+: -1

## 2021-05-05 ASSESSMENT — CONF VISUAL FIELD
METHOD: COUNTING FINGERS
OS_NORMAL: 1
OD_NORMAL: 1

## 2021-05-05 ASSESSMENT — TONOMETRY
OS_IOP_MMHG: 14
IOP_METHOD: TONOPEN
OD_IOP_MMHG: 12

## 2021-05-05 ASSESSMENT — CUP TO DISC RATIO
OD_RATIO: 0.2
OS_RATIO: 0.2

## 2021-05-05 ASSESSMENT — SLIT LAMP EXAM - LIDS
COMMENTS: NORMAL
COMMENTS: NORMAL

## 2021-05-05 ASSESSMENT — EXTERNAL EXAM - LEFT EYE: OS_EXAM: NORMAL

## 2021-05-05 NOTE — PROGRESS NOTES
CC: left eye floaters   HPI: Genesis Conklin is a  60 year old year-old patient followed by Dr Reyes for macular schisis left eye and has developed new floaters in her left eye.  The floaters left eye started yesterday. It looks like a couple of big ones floating to the center of the vision. Vision feels a little filmy. No flashing lights. No recent eye trauma.     Retinal Imaging:  OCT 5-5-21  RE: wnl  LE: inferior macular schisis- stable     Assessment & Plan:    # PVD left   -observe    #.  Macular schisis left eye- inferior macula   -Optos and fundus autofluorescence 3/21/19 and OCT today    -VA 20/15   -observe      #. High myopia left> right, anisometropia, no sign of amblyopia with lattice degeneration   -S/S of retinal tear/ detachment discussed    #. History of macular hole- resolved spontaneously by history left    - observe      RTC in 4 weeks, VTD    David Fung MD  Ophthalmology PGY-3    ~~~~~~~~~~~~~~~~~~~~~~~~~~~~~~~~~~   Complete documentation of historical and exam elements from today's encounter can be found in the full encounter summary report (not reduplicated in this progress note).  I personally obtained the chief complaint(s) and history of present illness.  I confirmed and edited as necessary the review of systems, past medical/surgical history, family history, social history, and examination findings as documented by others; and I examined the patient myself.  I personally reviewed the relevant tests, images, and reports as documented above.  I personally reviewed the ophthalmic test(s) associated with this encounter, agree with the interpretation(s) as documented by the resident/fellow, and have edited the corresponding report(s) as necessary.   I formulated and edited as necessary the assessment and plan and discussed the findings and management plan with the patient and family    Kena Saldivar MD   of Ophthalmology.  Retina Service   Department of  Ophthalmology and Visual Neurosciences   [facility]  Phone: [phone]   Fax: [phone]

## 2021-05-05 NOTE — NURSING NOTE
Chief Complaints and History of Present Illnesses   Patient presents with     Floaters      Chief Complaint(s) and History of Present Illness(es)     Floaters     Laterality: left eye    Quality: large    Duration: 1 day    Associated symptoms: Negative for flashes and shade              Comments     New patient evaluation of new floater left eye.  Larger floater in left eye noticed yesterday.  Has filmy vision with the floater.  No concerns of right eye.  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

## 2021-06-09 ENCOUNTER — OFFICE VISIT (OUTPATIENT)
Dept: OPHTHALMOLOGY | Facility: CLINIC | Age: 61
End: 2021-06-09
Attending: OPHTHALMOLOGY
Payer: COMMERCIAL

## 2021-06-09 DIAGNOSIS — Q89.7: ICD-10-CM

## 2021-06-09 DIAGNOSIS — H25.013 CORTICAL SENILE CATARACT OF BOTH EYES: ICD-10-CM

## 2021-06-09 DIAGNOSIS — H43.812 PVD (POSTERIOR VITREOUS DETACHMENT), LEFT: Primary | ICD-10-CM

## 2021-06-09 PROCEDURE — 92012 INTRM OPH EXAM EST PATIENT: CPT | Performed by: OPHTHALMOLOGY

## 2021-06-09 PROCEDURE — G0463 HOSPITAL OUTPT CLINIC VISIT: HCPCS

## 2021-06-09 ASSESSMENT — TONOMETRY
IOP_METHOD: TONOPEN
OD_IOP_MMHG: 19
OS_IOP_MMHG: 18

## 2021-06-09 ASSESSMENT — SLIT LAMP EXAM - LIDS
COMMENTS: NORMAL
COMMENTS: NORMAL

## 2021-06-09 ASSESSMENT — VISUAL ACUITY
OD_SC: 20/15
METHOD: SNELLEN - LINEAR
CORRECTION_TYPE: CONTACTS
OD_SC+: -2
OS_SC: 20/20
OS_SC+: -1

## 2021-06-09 ASSESSMENT — CONF VISUAL FIELD
OD_NORMAL: 1
METHOD: COUNTING FINGERS
OS_NORMAL: 1

## 2021-06-09 ASSESSMENT — EXTERNAL EXAM - LEFT EYE: OS_EXAM: NORMAL

## 2021-06-09 ASSESSMENT — EXTERNAL EXAM - RIGHT EYE: OD_EXAM: NORMAL

## 2021-06-09 ASSESSMENT — CUP TO DISC RATIO
OD_RATIO: 0.2
OS_RATIO: 0.2

## 2021-06-09 NOTE — NURSING NOTE
Chief Complaints and History of Present Illnesses   Patient presents with     Follow Up     Macular schisis left eye     Chief Complaint(s) and History of Present Illness(es)     Follow Up     Laterality: left eye    Quality: hazy and difficulty focusing    Frequency: intermittently    Timing: when reading and with computer use    Context: computer work, reading, near vision and night driving    Course: stable    Pain scale: 0/10    Comments: Macular schisis left eye              Comments     Macular schisis left eye recheck.  'One large central floater, unchanged from last visit, with some smaller ones.' Since last visit I see white stuff in the periphery of LE at night on occasion when driving at night'  A 'film, cloudiness' more noted while using computer screen, but states does see it all time if concentrating. States this appearance is unchanged from last visit.  Reading is more challenging as floaters follow along with gaze shifts.  Reina Torres, EL COT 9:11 AM 06/09/2021

## 2021-06-09 NOTE — PROGRESS NOTES
CC: left eye floaters   HPI: Genesis Conklin is a  60 year old year-old patient followed by Dr Reyes for macular schisis left eye and has developed new floaters in her left eye.  The floaters left eye started yesterday. It looks like a couple of big ones floating to the center of the vision. Vision feels a little filmy. No flashing lights. No recent eye trauma.   Interim: no new flashes and floaters. 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 History of macular hole- resolved spontaneously by history left    - observe    #Cataracts both eyes  not visually significant  observe        RTC in a year exam, VTD  Prescription next jose  Retina detachment precautions were discussed with the patient (presence or increased in flashes, floaters or a curtain in the visual field) and was asked to return if any of the those occur    ~~~~~~~~~~~~~~~~~~~~~~~~~~~~~~~~~~   Complete documentation of historical and exam elements from today's encounter can be found in the full encounter summary report (not reduplicated in this progress note).  I personally obtained the chief complaint(s) and history of present illness.  I confirmed and edited as necessary the review of systems, past medical/surgical history, family history, social history, and examination findings as documented by others; and I examined the patient myself.  I personally reviewed the relevant tests, images, and reports as documented above.  I formulated and edited as necessary the assessment and  plan and discussed the findings and management plan with the patient and family    Kena Saldivar MD   of Ophthalmology.  Retina Service   Department of Ophthalmology and Visual Neurosciences   [facility]  Phone: [phone]   Fax: [phone]

## 2021-09-05 ENCOUNTER — HEALTH MAINTENANCE LETTER (OUTPATIENT)
Age: 61
End: 2021-09-05

## 2021-09-28 ENCOUNTER — OFFICE VISIT (OUTPATIENT)
Dept: OPTOMETRY | Facility: CLINIC | Age: 61
End: 2021-09-28
Payer: COMMERCIAL

## 2021-09-28 DIAGNOSIS — H52.4 MYOPIA WITH PRESBYOPIA OF BOTH EYES: Primary | ICD-10-CM

## 2021-09-28 DIAGNOSIS — H52.13 MYOPIA WITH PRESBYOPIA OF BOTH EYES: Primary | ICD-10-CM

## 2021-09-28 ASSESSMENT — REFRACTION_WEARINGRX
OS_SPHERE: -10.25
OD_ADD: +2.25
OS_CYLINDER: +0.50
SPECS_TYPE: READING
OD_CYLINDER: SPHERE
OS_CYLINDER: +0.50
OD_SPHERE: -7.75
OS_AXIS: 002
OS_AXIS: 002
OD_CYLINDER: SPHERE
OS_ADD: +2.25
OD_SPHERE: -5.50
OS_SPHERE: -8.00

## 2021-09-28 ASSESSMENT — REFRACTION_CURRENTRX
OD_BASECURVE: 8.6
OD_SPHERE: -7.00
OS_ADDL_SPECS: HABITUAL
OD_ADDL_SPECS: HABITUAL
OS_BASECURVE: 8.6
OD_BASECURVE: 8.6
OD_SPHERE: -5.00
OS_DIAMETER: 14.0
OS_BASECURVE: 8.6
OD_BRAND: BIOFINITY
OD_BASECURVE: 8.6
OD_BRAND: BIOFINITY
OS_BRAND: BIOFINITY
OS_DIAMETER: 14.0
OD_DIAMETER: 14.0
OD_DIAMETER: 14.0
OS_BASECURVE: 8.6
OS_BRAND: BIOFINITY
OS_BRAND: BIOFINITY
OS_SPHERE: -8.50
OD_BRAND: BIOFINITY
OD_DIAMETER: 14.0
OD_SPHERE: -7.00
OS_SPHERE: -7.50
OS_DIAMETER: 14.0
OS_SPHERE: -8.50

## 2021-09-28 ASSESSMENT — REFRACTION_MANIFEST
OS_CYLINDER: +1.00
OD_CYLINDER: SPHERE
OS_SPHERE: -11.00
OS_AXIS: 015
OD_SPHERE: -7.50

## 2021-09-28 ASSESSMENT — SLIT LAMP EXAM - LIDS
COMMENTS: NORMAL
COMMENTS: NORMAL

## 2021-09-28 ASSESSMENT — TONOMETRY
OD_IOP_MMHG: 16
OS_IOP_MMHG: 11
IOP_METHOD: ICARE

## 2021-09-28 ASSESSMENT — VISUAL ACUITY
CORRECTION_TYPE: CONTACTS
OS_CC: 20/20-1
METHOD: SNELLEN - LINEAR
OD_CC: 20/20

## 2021-09-28 ASSESSMENT — EXTERNAL EXAM - LEFT EYE: OS_EXAM: NORMAL

## 2021-09-28 ASSESSMENT — CONF VISUAL FIELD
OS_NORMAL: 1
OD_NORMAL: 1

## 2021-09-28 ASSESSMENT — EXTERNAL EXAM - RIGHT EYE: OD_EXAM: NORMAL

## 2021-09-28 NOTE — PROGRESS NOTES
A/P  1.) Myopia/Presbyopia each eye  -Overall doing well in soft CL wear, currently in DVO each eye with readers over  -Right eye overminused, sees 20/15 with reduces minus Rx  -Trialed monovision with pt today (not interested in MF) - she measures left eye dominant on testing but prefers monovision left eye near setup on trial  -Left eye looking through some cloudiness/haze from floaters, not as crisp as right eye. Visually more comfortable in monovision left eye near  -CLRx updated - option to continue DVO or monovision left eye near as desired  -Undilated ocular health unremarkable. DFE with Dr. Saldivar 3 months ago, no new flashes/floaters since then.    Will follow annually with retina for DFE, can f/u here 2 years sooner prn for vision changes

## 2021-10-31 ENCOUNTER — HEALTH MAINTENANCE LETTER (OUTPATIENT)
Age: 61
End: 2021-10-31

## 2022-02-20 ENCOUNTER — HEALTH MAINTENANCE LETTER (OUTPATIENT)
Age: 62
End: 2022-02-20

## 2022-06-03 DIAGNOSIS — H43.812 PVD (POSTERIOR VITREOUS DETACHMENT), LEFT: Primary | ICD-10-CM

## 2022-06-13 ENCOUNTER — OFFICE VISIT (OUTPATIENT)
Dept: OPHTHALMOLOGY | Facility: CLINIC | Age: 62
End: 2022-06-13
Attending: OPHTHALMOLOGY
Payer: COMMERCIAL

## 2022-06-13 DIAGNOSIS — H43.812 PVD (POSTERIOR VITREOUS DETACHMENT), LEFT: ICD-10-CM

## 2022-06-13 PROCEDURE — 92134 CPTRZ OPH DX IMG PST SGM RTA: CPT | Performed by: OPHTHALMOLOGY

## 2022-06-13 PROCEDURE — 99207 FUNDUS AUTOFLUORESCENCE IMAGE (FAF) OU (BOTH EYES): CPT | Mod: 26 | Performed by: OPHTHALMOLOGY

## 2022-06-13 PROCEDURE — 92250 FUNDUS PHOTOGRAPHY W/I&R: CPT | Performed by: OPHTHALMOLOGY

## 2022-06-13 PROCEDURE — G0463 HOSPITAL OUTPT CLINIC VISIT: HCPCS

## 2022-06-13 PROCEDURE — 99207 PR BUNDLED PROCEDURE IN GLOBAL PKG: CPT | Mod: 26 | Performed by: OPHTHALMOLOGY

## 2022-06-13 PROCEDURE — 92014 COMPRE OPH EXAM EST PT 1/>: CPT | Performed by: OPHTHALMOLOGY

## 2022-06-13 ASSESSMENT — CONF VISUAL FIELD
METHOD: COUNTING FINGERS
OS_NORMAL: 1
OD_NORMAL: 1

## 2022-06-13 ASSESSMENT — VISUAL ACUITY
OD_CC+: +2
OS_CC: 20/20
METHOD: SNELLEN - LINEAR
CORRECTION_TYPE: CONTACTS
OS_CC+: -1
OD_CC: 20/20

## 2022-06-13 ASSESSMENT — CUP TO DISC RATIO
OD_RATIO: 0.2
OS_RATIO: 0.2

## 2022-06-13 ASSESSMENT — TONOMETRY
IOP_METHOD: TONOPEN
OD_IOP_MMHG: 16
OS_IOP_MMHG: 17

## 2022-06-13 ASSESSMENT — EXTERNAL EXAM - LEFT EYE: OS_EXAM: NORMAL

## 2022-06-13 ASSESSMENT — SLIT LAMP EXAM - LIDS
COMMENTS: NORMAL
COMMENTS: NORMAL

## 2022-06-13 ASSESSMENT — EXTERNAL EXAM - RIGHT EYE: OD_EXAM: NORMAL

## 2022-06-13 NOTE — NURSING NOTE
Chief Complaints and History of Present Illnesses   Patient presents with     Posterior Vitreous Detachment Follow Up     PVD left     Chief Complaint(s) and History of Present Illness(es)     Posterior Vitreous Detachment Follow Up     Comments: PVD left              Comments     Pt states no change in VA since last visit  Pt states increased floaters since last visit  No flashes, eye pain or redness    Marielena Hummel COT 9:24 AM June 13, 2022

## 2022-06-13 NOTE — PROGRESS NOTES
CC: left eye floaters   HPI: Genesis Conklin is a  61 year old year-old patient here for follow up macular schisis left eye and myopia.    Interim: no new flashes and floaters. Floaters still bothersome but doing ok. Vision feels a little filmy. No flashing lights. No recent eye trauma.   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, anisometropia,   no sign of amblyopia with    # lattice degeneration   -S/S of retinal tear/ detachment discussed    #Cataracts both eyes  Left eye with temporal cortical changes > right eye   not visually significant  observe        RTC in a year exam, VTD; Optical Coherence Tomography macula 55 degrees and prescription   Prescription next jose  Retina detachment precautions were discussed with the patient (presence or increased in flashes, floaters or a curtain in the visual field) and was asked to return if any of the those occur    ~~~~~~~~~~~~~~~~~~~~~~~~~~~~~~~~~~   Complete documentation of historical and exam elements from today's encounter can be found in the full encounter summary report (not reduplicated in this progress note).  I personally obtained the chief complaint(s) and history of present illness.  I confirmed and edited as necessary the review of systems, past medical/surgical history, family history, social history, and examination findings as documented by others; and I examined the patient myself.  I personally reviewed  the relevant tests, images, and reports as documented above.  I formulated and edited as necessary the assessment and plan and discussed the findings and management plan with the patient and family    Kena Saldivar MD   of Ophthalmology.  Retina Service   Department of Ophthalmology and Visual Neurosciences   [facility]  Phone: [phone]   Fax: [phone]

## 2022-10-04 ENCOUNTER — TELEPHONE (OUTPATIENT)
Dept: OPTOMETRY | Facility: CLINIC | Age: 62
End: 2022-10-04

## 2022-10-04 NOTE — TELEPHONE ENCOUNTER
Pt received a Lomography message and responded    Will communicate via Lomography message    Brian Xiao RN 3:29 PM 10/04/22           [facility]    Phone Message    May a detailed message be left on voicemail: yes     Reason for Call: Other: Pt would like to speak with Dr. Guido about getting contact lenses ordered but there was a change that she wanted to make from the last time she ordered. Please contact pt to discuss.     Thank you     Action Taken: Message routed to:  [facility] ([facility] eye    Travel Screening: Not Applicable

## 2022-10-05 ENCOUNTER — TELEPHONE (OUTPATIENT)
Dept: OPTOMETRY | Facility: CLINIC | Age: 62
End: 2022-10-05

## 2022-10-05 ENCOUNTER — OFFICE VISIT (OUTPATIENT)
Dept: OPTOMETRY | Facility: CLINIC | Age: 62
End: 2022-10-05
Payer: COMMERCIAL

## 2022-10-05 DIAGNOSIS — H52.4 MYOPIA WITH PRESBYOPIA OF BOTH EYES: Primary | ICD-10-CM

## 2022-10-05 DIAGNOSIS — H52.13 MYOPIA WITH PRESBYOPIA OF BOTH EYES: Primary | ICD-10-CM

## 2022-10-05 ASSESSMENT — REFRACTION_CURRENTRX
OS_BASECURVE: 8.6
OD_BRAND: BIOFINITY
OS_SPHERE: -8.50
OD_BASECURVE: 8.6
OD_DIAMETER: 14.0
OD_BASECURVE: 8.6
OD_BRAND: BIOFINITY
OS_BRAND: BIOFINITY
OD_SPHERE: -6.50
OS_DIAMETER: 14.0
OD_DIAMETER: 14.0
OS_BASECURVE: 8.6
OS_BRAND: BIOFINITY
OD_SPHERE: -6.50
OS_DIAMETER: 14.0
OS_SPHERE: -7.50

## 2022-10-05 NOTE — PROGRESS NOTES
No office visit. CL order only. Pt would like distance vision OU    Contact Lens Billing  V-Code:  - Soft spherical     Final Contact Lens Rx       Brand Base Curve Diameter Sphere Lens    Right Biofinity 8.6 14.0 -6.50 distance lens    Left Biofinity 8.6 14.0 -8.50 distance lens         [facility] order mailed direct: [id number]    # of units: 2 boxes (1 per eye)  Price per Unit: $52 per box + $7.95 shipping = $111.95 total    These are for cosmetic contact lenses.    Encounter Diagnosis   Name Primary?     Myopia with presbyopia of both eyes Yes        Date of last eye exam: 9/28/21

## 2022-10-23 ENCOUNTER — HEALTH MAINTENANCE LETTER (OUTPATIENT)
Age: 62
End: 2022-10-23

## 2022-12-10 ENCOUNTER — HEALTH MAINTENANCE LETTER (OUTPATIENT)
Age: 62
End: 2022-12-10

## 2023-04-13 DIAGNOSIS — H43.812 PVD (POSTERIOR VITREOUS DETACHMENT), LEFT: Primary | ICD-10-CM

## 2023-04-27 ENCOUNTER — OFFICE VISIT (OUTPATIENT)
Dept: OPHTHALMOLOGY | Facility: CLINIC | Age: 63
End: 2023-04-27
Attending: OPHTHALMOLOGY
Payer: COMMERCIAL

## 2023-04-27 DIAGNOSIS — H43.812 PVD (POSTERIOR VITREOUS DETACHMENT), LEFT: ICD-10-CM

## 2023-04-27 PROCEDURE — 92134 CPTRZ OPH DX IMG PST SGM RTA: CPT | Performed by: OPHTHALMOLOGY

## 2023-04-27 PROCEDURE — 99213 OFFICE O/P EST LOW 20 MIN: CPT | Performed by: OPHTHALMOLOGY

## 2023-04-27 PROCEDURE — 99214 OFFICE O/P EST MOD 30 MIN: CPT | Performed by: OPHTHALMOLOGY

## 2023-04-27 PROCEDURE — 92015 DETERMINE REFRACTIVE STATE: CPT

## 2023-04-27 RX ORDER — MULTIVIT-MIN/IRON/FOLIC ACID/K 18-600-40
CAPSULE ORAL
COMMUNITY

## 2023-04-27 ASSESSMENT — SLIT LAMP EXAM - LIDS
COMMENTS: NORMAL
COMMENTS: NORMAL

## 2023-04-27 ASSESSMENT — VISUAL ACUITY
OS_CC+: -2
METHOD: SNELLEN - LINEAR
OD_CC: 20/20
OD_CC+: +2
CORRECTION_TYPE: CONTACTS
OS_CC: 20/20

## 2023-04-27 ASSESSMENT — REFRACTION_WEARINGRX
OD_CYLINDER: SPHERE
OD_SPHERE: -7.75
OS_SPHERE: -10.25
SPECS_TYPE: PAL
OS_AXIS: 002
OD_ADD: +2.25
OS_ADD: +2.25
OS_CYLINDER: +0.50

## 2023-04-27 ASSESSMENT — CONF VISUAL FIELD
OD_INFERIOR_TEMPORAL_RESTRICTION: 0
OS_SUPERIOR_NASAL_RESTRICTION: 0
OD_INFERIOR_NASAL_RESTRICTION: 0
OS_SUPERIOR_TEMPORAL_RESTRICTION: 0
OD_SUPERIOR_TEMPORAL_RESTRICTION: 0
METHOD: COUNTING FINGERS
OS_NORMAL: 1
OS_INFERIOR_NASAL_RESTRICTION: 0
OD_NORMAL: 1
OD_SUPERIOR_NASAL_RESTRICTION: 0
OS_INFERIOR_TEMPORAL_RESTRICTION: 0

## 2023-04-27 ASSESSMENT — TONOMETRY
OD_IOP_MMHG: 15
OS_IOP_MMHG: 15
IOP_METHOD: TONOPEN

## 2023-04-27 ASSESSMENT — REFRACTION_MANIFEST
OS_ADD: +2.25
OS_SPHERE: -11.00
OS_AXIS: 015
OS_CYLINDER: +0.50
OD_CYLINDER: SPHERE
OD_ADD: +2.25
OD_SPHERE: -7.50

## 2023-04-27 ASSESSMENT — CUP TO DISC RATIO
OS_RATIO: 0.2
OD_RATIO: 0.2

## 2023-04-27 ASSESSMENT — EXTERNAL EXAM - LEFT EYE: OS_EXAM: NORMAL

## 2023-04-27 ASSESSMENT — EXTERNAL EXAM - RIGHT EYE: OD_EXAM: NORMAL

## 2023-04-27 NOTE — NURSING NOTE
Chief Complaints and History of Present Illnesses   Patient presents with     Annual Eye Exam     Chief Complaint(s) and History of Present Illness(es)     Annual Eye Exam            Associated symptoms: floaters.  Negative for eye pain and flashes    Treatments tried: no treatments    Pain scale: 0/10          Comments    Still seeing significant floaters LE>RE. No flashes  Happy with vision both distance and near.    Not currently taking any eyedrops.    Celso Wisdom 2:56 PM April 27, 2023

## 2023-04-28 NOTE — PROGRESS NOTES
CC: left eye floaters   HPI: Genesis Conklin is a 62 year old patient here for follow up Posterior vitreous detachment (PVD), macular schisis left eye and myopia.  Interim: no new flashes and floaters. Floaters still bothersome but doing ok. No flashing lights.    Retinal Imaging:  OCT 04/27/23   RE: wnl  LE: decreased foveal contour. 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, anisometropia,   no sign of amblyopia with    # lattice degeneration   -S/S of retinal tear/ detachment discussed    #Cataracts both eyes  Left eye with temporal cortical changes > right eye   not visually significant  observe        RTC in a year exam, VTD; Optical Coherence Tomography macula 55 degrees and prescription   Retina detachment precautions were discussed with the patient (presence or increased in flashes, floaters or a curtain in the visual field) and was asked to return if any of the those occur  Prescription given  ~~~~~~~~~~~~~~~~~~~~~~~~~~~~~~~~~~   Complete documentation of historical and exam elements from today's encounter can be found in the full encounter summary report (not reduplicated in this progress note).  I personally obtained the chief complaint(s) and history of present illness.  I confirmed and edited as necessary the review of systems, past medical/surgical history, family history, social history, and examination findings as documented by others; and I examined the patient myself.  I personally reviewed the relevant tests, images, and reports as documented above.  I formulated and  edited as necessary the assessment and plan and discussed the findings and management plan with the patient and family    Kena Saldivar MD   of Ophthalmology.  Retina Service   Department of Ophthalmology and Visual Neurosciences   [facility]  Phone: [phone]   Fax: [phone]

## 2023-10-02 ENCOUNTER — OFFICE VISIT (OUTPATIENT)
Dept: OPTOMETRY | Facility: CLINIC | Age: 63
End: 2023-10-02
Payer: COMMERCIAL

## 2023-10-02 DIAGNOSIS — H52.13 MYOPIA WITH PRESBYOPIA OF BOTH EYES: Primary | ICD-10-CM

## 2023-10-02 DIAGNOSIS — H52.4 MYOPIA WITH PRESBYOPIA OF BOTH EYES: Primary | ICD-10-CM

## 2023-10-02 ASSESSMENT — REFRACTION_MANIFEST
OD_SPHERE: -7.75
OD_CYLINDER: SPHERE
OS_SPHERE: -11.75
OS_CYLINDER: +0.75
OS_AXIS: 015

## 2023-10-02 ASSESSMENT — REFRACTION_CURRENTRX
OD_BRAND: BIOFINITY
OD_DIAMETER: 14.0
OS_DIAMETER: 14.0
OD_SPHERE: -6.50
OS_BRAND: BIOFINITY
OD_BASECURVE: 8.6
OD_SPHERE: -6.50
OD_DIAMETER: 14.0
OS_SPHERE: -8.50
OD_BASECURVE: 8.6
OS_BASECURVE: 8.6
OS_DIAMETER: 14.0
OS_BASECURVE: 8.6
OS_SPHERE: -9.00
OS_BRAND: BIOFINITY
OD_BRAND: BIOFINITY

## 2023-10-02 ASSESSMENT — TONOMETRY
OS_IOP_MMHG: 20
OD_IOP_MMHG: 17
IOP_METHOD: ICARE

## 2023-10-02 ASSESSMENT — EXTERNAL EXAM - RIGHT EYE: OD_EXAM: NORMAL

## 2023-10-02 ASSESSMENT — REFRACTION_WEARINGRX
OD_CYLINDER: SPHERE
OD_SPHERE: -7.50
SPECS_TYPE: PAL
OS_AXIS: 015
OS_SPHERE: -11.00
OS_CYLINDER: +0.50
OS_ADD: +2.25
OD_ADD: +2.25

## 2023-10-02 ASSESSMENT — VISUAL ACUITY
OD_CC: 20/20-2
CORRECTION_TYPE: CONTACTS
METHOD: SNELLEN - LINEAR
OS_CC: 20/40-2

## 2023-10-02 ASSESSMENT — CONF VISUAL FIELD
OD_INFERIOR_NASAL_RESTRICTION: 0
OS_INFERIOR_TEMPORAL_RESTRICTION: 0
OS_SUPERIOR_TEMPORAL_RESTRICTION: 0
OS_SUPERIOR_NASAL_RESTRICTION: 0
OD_SUPERIOR_TEMPORAL_RESTRICTION: 0
OD_SUPERIOR_NASAL_RESTRICTION: 0
OD_NORMAL: 1
OS_NORMAL: 1
OD_INFERIOR_TEMPORAL_RESTRICTION: 0
OS_INFERIOR_NASAL_RESTRICTION: 0

## 2023-10-02 ASSESSMENT — CUP TO DISC RATIO
OS_RATIO: 0.20
OD_RATIO: 0.20

## 2023-10-02 ASSESSMENT — EXTERNAL EXAM - LEFT EYE: OS_EXAM: NORMAL

## 2023-10-02 ASSESSMENT — SLIT LAMP EXAM - LIDS
COMMENTS: NORMAL
COMMENTS: NORMAL

## 2023-10-02 NOTE — PROGRESS NOTES
A/P  1.) Myopia/Presbyopia each eye  -Overall doing well in soft CL wear, currently in DVO each eye with readers over  -Noticing distance changes left eye. VA improves back to 20/20 with myopic shift  -Did send with higher minus OU but would avoid unless very good acuity as this will likely remove her intermediate range and she is still 20/20 both eyes with reduced minus  -Did not like monovision or MF previously  -Undilated ocular health unremarkable. DFE with Dr. Saldivar 6 months ago, no new flashes/floaters since then.    She follows annually with retina dept for DFE. Can f/u here 2 years sooner prn for vision changes

## 2023-11-05 ENCOUNTER — HEALTH MAINTENANCE LETTER (OUTPATIENT)
Age: 63
End: 2023-11-05

## 2024-01-14 ENCOUNTER — HEALTH MAINTENANCE LETTER (OUTPATIENT)
Age: 64
End: 2024-01-14

## 2024-03-16 ENCOUNTER — OFFICE VISIT (OUTPATIENT)
Dept: URGENT CARE | Facility: URGENT CARE | Age: 64
End: 2024-03-16
Payer: COMMERCIAL

## 2024-03-16 VITALS
HEART RATE: 66 BPM | WEIGHT: 128 LBS | SYSTOLIC BLOOD PRESSURE: 130 MMHG | TEMPERATURE: 97.3 F | DIASTOLIC BLOOD PRESSURE: 69 MMHG | OXYGEN SATURATION: 98 % | RESPIRATION RATE: 20 BRPM

## 2024-03-16 DIAGNOSIS — J20.9 ACUTE BRONCHITIS, UNSPECIFIED ORGANISM: Primary | ICD-10-CM

## 2024-03-16 PROCEDURE — 99203 OFFICE O/P NEW LOW 30 MIN: CPT | Performed by: PHYSICIAN ASSISTANT

## 2024-03-16 RX ORDER — DOXYCYCLINE 100 MG/1
100 CAPSULE ORAL 2 TIMES DAILY
Qty: 14 CAPSULE | Refills: 0 | Status: SHIPPED | OUTPATIENT
Start: 2024-03-16 | End: 2024-03-23

## 2024-03-16 RX ORDER — BENZONATATE 200 MG/1
200 CAPSULE ORAL 3 TIMES DAILY PRN
Qty: 30 CAPSULE | Refills: 0 | Status: SHIPPED | OUTPATIENT
Start: 2024-03-16

## 2024-03-16 NOTE — PATIENT INSTRUCTIONS
1.  Plenty of fluids, rest, warm compresses on face  2.  Mucinex twice daily for at least 4 days  3.  Terrie Pot 1x in the morning 1x at night (SALINE MIST SPRAY IS AN ACCEPTABLE, THOUGH NOT AS EFFECTIVE REPLACEMENT)  4.  Benadryl (diphenhydramine) at bedtime   5.  Either Claritin (Loratadine), Allegra (Fexofenadine), or Zyrtec (Cetirizine) in the day  6.  Flonase (Fluticasone) 2x each nostril twice a day for two weeks, then once each nostril once a day       Please let us know if symptoms persist, or worsen.  Fever and focal pain may be a sign of a bacterial infection which may need antibiotic treatment

## 2024-04-12 NOTE — PROGRESS NOTES
SUBJECTIVE:   Genesis Conklin is a 63 year old female presenting with a chief complaint of runny nose and cough - non-productive.  Onset of symptoms was 2 week(s) ago.  Course of illness is same.    Severity moderate  Current and Associated symptoms: runny nose and cough - non-productive  Treatment measures tried include Tylenol/Ibuprofen.  Predisposing factors include None.    Past Medical History:   Diagnosis Date    ACL (anterior cruciate ligament) rupture     PVD (posterior vitreous detachment), right      Current Outpatient Medications   Medication Sig Dispense Refill    Ascorbic Acid (VITAMIN C) 500 MG CAPS       benzonatate (TESSALON) 200 MG capsule Take 1 capsule (200 mg) by mouth 3 times daily as needed for cough 30 capsule 0    cholecalciferol (VITAMIN D3) 25 mcg (1000 units) capsule       magnesium oxide 400 MG CAPS       Pediatric Multivitamins-Fl (MULTIVITAMIN WITH 1 MG FLUORIDE) 1 MG CHEW Take 1 tablet by mouth daily      Probiotic Product (PROBIOTIC DAILY PO)        Social History     Tobacco Use    Smoking status: Never    Smokeless tobacco: Never   Substance Use Topics    Alcohol use: Not on file       ROS:  Review of systems negative except as stated above.    OBJECTIVE:  /69   Pulse 66   Temp 97.3  F (36.3  C)   Resp 20   Wt 58.1 kg (128 lb)   SpO2 98%   GENERAL APPEARANCE: healthy, alert and no distress  HENT: ear canals and TM's normal.  Nose and mouth without ulcers, erythema or lesions  RESP: lungs clear to auscultation - no rales, rhonchi or wheezes  CV: regular rates and rhythm, normal S1 S2, no murmur noted  NEURO: Normal strength and tone, sensory exam grossly normal,  normal speech and mentation  SKIN: no suspicious lesions or rashes      ASSESSMENT:  (J20.9) Acute bronchitis, unspecified organism  (primary encounter diagnosis)  Plan: benzonatate (TESSALON) 200 MG capsule,         doxycycline hyclate (VIBRAMYCIN) 100 MG capsule      Follow up with PCP if symptoms worsen or  fail to improve

## 2025-01-26 ENCOUNTER — HEALTH MAINTENANCE LETTER (OUTPATIENT)
Age: 65
End: 2025-01-26

## 2025-02-16 ENCOUNTER — OFFICE VISIT (OUTPATIENT)
Dept: URGENT CARE | Facility: URGENT CARE | Age: 65
End: 2025-02-16
Payer: COMMERCIAL

## 2025-02-16 VITALS
TEMPERATURE: 97.5 F | OXYGEN SATURATION: 100 % | SYSTOLIC BLOOD PRESSURE: 114 MMHG | WEIGHT: 139 LBS | RESPIRATION RATE: 14 BRPM | DIASTOLIC BLOOD PRESSURE: 71 MMHG | HEART RATE: 64 BPM

## 2025-02-16 DIAGNOSIS — R50.9 FEVER, UNSPECIFIED FEVER CAUSE: ICD-10-CM

## 2025-02-16 DIAGNOSIS — R05.1 ACUTE COUGH: Primary | ICD-10-CM

## 2025-02-16 LAB
FLUAV AG SPEC QL IA: NEGATIVE
FLUBV AG SPEC QL IA: NEGATIVE

## 2025-02-16 PROCEDURE — 87804 INFLUENZA ASSAY W/OPTIC: CPT | Performed by: FAMILY MEDICINE

## 2025-02-16 PROCEDURE — 99213 OFFICE O/P EST LOW 20 MIN: CPT | Performed by: FAMILY MEDICINE

## 2025-02-16 ASSESSMENT — PAIN SCALES - GENERAL: PAINLEVEL_OUTOF10: NO PAIN (0)

## 2025-02-16 NOTE — PROGRESS NOTES
Assessment & Plan     Fever, unspecified fever cause     - Influenza A & B Antigen - Clinic Collect    Acute cough        # Cough: Symptoms noted over the past 8 days in the setting of traveling to [place].  She does report having a little bit of a sore throat initially with cough progressing and other symptoms subsiding.  Productive cough is the main symptoms currently.  On examination vital signs within normal limits, lungs clear to auscultation bilaterally.  Influenza was negative.  Will treat as viral upper respiratory infection symptomatically.  Plan to follow-up if not improving over the next week or 2, sooner if worsening.  Testing Findings: flu negative  Treatment: symptomatic treatments  Medications: Buckwheat Honey, Limited tylenol/ibuprofen for pain for 1-2 weeks   Follow-up: In 1-2 weeks if symptoms do not improve with primary care provider, sooner if worsening  Can consider repeat evaluation     Return in about 2 weeks (around 3/2/2025), or if symptoms worsen or fail to improve.    Dion Dodd MD  [facility]    Cassidy Wells is a 64 year old female who presents to clinic today for the following health issues:  Chief Complaint   Patient presents with    Cough         2/16/2025     9:33 AM   Additional Questions   Roomed by lynnette ADDISON Adult    Onset of symptoms was 8 day(s) ago. Just got back from a trip to [place]. Cold moved to chest, productive cough mucus. Had bronchitis last year. No fever/chills.   Course of illness is same.    Severity mild  Current and Associated symptoms: cough - productive  Treatment measures tried include No meds.  Predisposing factors include Traveled to [place].       Review of Systems  Constitutional, HEENT, cardiovascular, pulmonary, gi and gu systems are negative, except as otherwise noted.      Objective    /71 (BP Location: Right arm, Patient Position: Sitting)   Pulse 64   Temp 97.5   F (36.4  C) (Oral)   Resp 14   Wt 63 kg (139 lb)   SpO2 100%   Physical Exam   GENERAL: alert and no distress  NECK: no adenopathy, no asymmetry, masses, or scars  RESP: lungs clear to auscultation - no rales, rhonchi or wheezes  CV: regular rate and rhythm, normal S1 S2, no S3 or S4, no murmur, click or rub, no peripheral edema  ABDOMEN: soft, nontender, no hepatosplenomegaly, no masses and bowel sounds normal  MS: no gross musculoskeletal defects noted, no edema    Influenza neg

## 2025-02-16 NOTE — PATIENT INSTRUCTIONS
# Cough: Symptoms noted over the past 8 days in the setting of traveling to [place].  She does report having a little bit of a sore throat initially with cough progressing and other symptoms subsiding.  Productive cough is the main symptoms currently.  On examination vital signs within normal limits, lungs clear to auscultation bilaterally.  Influenza was negative.  Will treat as viral upper respiratory infection symptomatically.  Plan to follow-up if not improving over the next week or 2, sooner if worsening.  Testing Findings: flu negative  Treatment: symptomatic treatments  Medications: Buckwheat Honey, Limited tylenol/ibuprofen for pain for 1-2 weeks   Follow-up: In 1-2 weeks if symptoms do not improve with primary care provider, sooner if worsening  Can consider repeat evaluation     If you have not yet received the influenza vaccine but would like to get one, please call  [phone] or you can schedule via [email or web address]    It was great seeing you today!    Dion Dodd MD, [facility]

## 2025-03-25 ENCOUNTER — OFFICE VISIT (OUTPATIENT)
Dept: OPTOMETRY | Facility: CLINIC | Age: 65
End: 2025-03-25
Payer: COMMERCIAL

## 2025-03-25 DIAGNOSIS — H43.813 PVD (POSTERIOR VITREOUS DETACHMENT), BILATERAL: ICD-10-CM

## 2025-03-25 DIAGNOSIS — H52.229 MYOPIA WITH REGULAR ASTIGMATISM AND PRESBYOPIA: Primary | ICD-10-CM

## 2025-03-25 DIAGNOSIS — H52.4 MYOPIA WITH REGULAR ASTIGMATISM AND PRESBYOPIA: Primary | ICD-10-CM

## 2025-03-25 DIAGNOSIS — H52.10 MYOPIA WITH REGULAR ASTIGMATISM AND PRESBYOPIA: Primary | ICD-10-CM

## 2025-03-25 ASSESSMENT — SLIT LAMP EXAM - LIDS
COMMENTS: NORMAL
COMMENTS: NORMAL

## 2025-03-25 ASSESSMENT — REFRACTION_WEARINGRX
OS_AXIS: 020
OD_AXIS: 040
OD_SPHERE: -7.75
OS_AXIS: 015
OD_CYLINDER: +0.25
OD_ADD: +2.50
OS_CYLINDER: +0.75
OS_SPHERE: -10.00
OS_ADD: +2.50
OS_CYLINDER: +1.00
OD_CYLINDER: SPHERE
OD_SPHERE: -7.25
OS_SPHERE: -11.75

## 2025-03-25 ASSESSMENT — CONF VISUAL FIELD
OS_INFERIOR_NASAL_RESTRICTION: 0
OS_SUPERIOR_NASAL_RESTRICTION: 0
OS_SUPERIOR_TEMPORAL_RESTRICTION: 0
OD_SUPERIOR_NASAL_RESTRICTION: 0
OS_NORMAL: 1
OD_INFERIOR_NASAL_RESTRICTION: 0
OD_INFERIOR_TEMPORAL_RESTRICTION: 0
OS_INFERIOR_TEMPORAL_RESTRICTION: 0
OD_SUPERIOR_TEMPORAL_RESTRICTION: 0
OD_NORMAL: 1

## 2025-03-25 ASSESSMENT — REFRACTION_CURRENTRX
OD_BASECURVE: 8.6
OS_DIAMETER: 14.0
OD_BRAND: BIOFINITY
OS_BRAND: BIOFINITY
OD_DIAMETER: 14.0
OD_SPHERE: -6.50
OS_SPHERE: -9.00
OS_BASECURVE: 8.6

## 2025-03-25 ASSESSMENT — TONOMETRY
IOP_METHOD: ICARE
OS_IOP_MMHG: 15
OD_IOP_MMHG: 16

## 2025-03-25 ASSESSMENT — CUP TO DISC RATIO
OS_RATIO: 0.20
OD_RATIO: 0.20

## 2025-03-25 ASSESSMENT — REFRACTION_MANIFEST
OD_SPHERE: -8.00
OS_SPHERE: -12.25
OS_AXIS: 015
OD_CYLINDER: SPHERE
OS_CYLINDER: +0.75

## 2025-03-25 ASSESSMENT — EXTERNAL EXAM - LEFT EYE: OS_EXAM: NORMAL

## 2025-03-25 ASSESSMENT — VISUAL ACUITY
OS_CC: 20/25-3
CORRECTION_TYPE: CONTACTS
OD_CC: 20/20-2
METHOD: SNELLEN - LINEAR

## 2025-03-25 ASSESSMENT — EXTERNAL EXAM - RIGHT EYE: OD_EXAM: NORMAL

## 2025-03-25 NOTE — PROGRESS NOTES
A/P  1.) Myopia/Astig/Presbyopia OU  -Doing well in single vision DVO CL's, readers over. Does take small minus OR but may lose mid-range. She will trial -9.50 on left to see if she prefers in real life setting and MyChart me if electing to update. Did not previously like monovision or MF  -Still correcting well to 20/20 both eyes with stable glasses Rx. Minimal NS, not visually significant    2.) PVD OU  -Previously followed by retina, now stable  -Reviewed signs/symptoms of RD and need for stat eye eval should they occur    Monitor 1-2 years comprehensive, sooner prn    I have confirmed the patient's CC, HPI and reviewed Past Medical History, Past Surgical History, Social History, Family History, Problem List, Medication List and agree with Tech note.     Naila Guido, OD FAAO FSLS